# Patient Record
Sex: FEMALE | Race: BLACK OR AFRICAN AMERICAN | NOT HISPANIC OR LATINO | URBAN - METROPOLITAN AREA
[De-identification: names, ages, dates, MRNs, and addresses within clinical notes are randomized per-mention and may not be internally consistent; named-entity substitution may affect disease eponyms.]

---

## 2017-01-26 ENCOUNTER — INPATIENT (INPATIENT)
Facility: HOSPITAL | Age: 82
LOS: 12 days | Discharge: NURSING, REHAB OR LONG-TERM CARE | End: 2017-02-08
Attending: INTERNAL MEDICINE | Admitting: INTERNAL MEDICINE
Payer: MEDICARE

## 2017-01-26 VITALS
RESPIRATION RATE: 18 BRPM | OXYGEN SATURATION: 99 % | TEMPERATURE: 99 F | HEART RATE: 81 BPM | DIASTOLIC BLOOD PRESSURE: 85 MMHG | SYSTOLIC BLOOD PRESSURE: 158 MMHG

## 2017-01-26 DIAGNOSIS — Z41.8 ENCOUNTER FOR OTHER PROCEDURES FOR PURPOSES OTHER THAN REMEDYING HEALTH STATE: ICD-10-CM
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
## 2017-01-26 DIAGNOSIS — I10 ESSENTIAL (PRIMARY) HYPERTENSION: ICD-10-CM

## 2017-01-26 DIAGNOSIS — R41.82 ALTERED MENTAL STATUS, UNSPECIFIED: ICD-10-CM

## 2017-01-26 DIAGNOSIS — G30.8 OTHER ALZHEIMER'S DISEASE: ICD-10-CM

## 2017-01-26 LAB
ALBUMIN SERPL ELPH-MCNC: 4.1 G/DL — SIGNIFICANT CHANGE UP (ref 3.3–5)
ALP SERPL-CCNC: 131 U/L — HIGH (ref 40–120)
ALT FLD-CCNC: 12 U/L — SIGNIFICANT CHANGE UP (ref 4–33)
AMPHET UR-MCNC: NEGATIVE — SIGNIFICANT CHANGE UP
APAP SERPL-MCNC: < 15 UG/ML — LOW (ref 15–25)
APPEARANCE UR: CLEAR — SIGNIFICANT CHANGE UP
AST SERPL-CCNC: 26 U/L — SIGNIFICANT CHANGE UP (ref 4–32)
BARBITURATES MEASUREMENT: NEGATIVE — SIGNIFICANT CHANGE UP
BARBITURATES UR SCN-MCNC: NEGATIVE — SIGNIFICANT CHANGE UP
BASOPHILS # BLD AUTO: 0.03 K/UL — SIGNIFICANT CHANGE UP (ref 0–0.2)
BASOPHILS NFR BLD AUTO: 0.3 % — SIGNIFICANT CHANGE UP (ref 0–2)
BENZODIAZ SERPL-MCNC: NEGATIVE — SIGNIFICANT CHANGE UP
BENZODIAZ UR-MCNC: NEGATIVE — SIGNIFICANT CHANGE UP
BILIRUB SERPL-MCNC: 0.8 MG/DL — SIGNIFICANT CHANGE UP (ref 0.2–1.2)
BILIRUB UR-MCNC: NEGATIVE — SIGNIFICANT CHANGE UP
BLOOD UR QL VISUAL: NEGATIVE — SIGNIFICANT CHANGE UP
BUN SERPL-MCNC: 15 MG/DL — SIGNIFICANT CHANGE UP (ref 7–23)
CALCIUM SERPL-MCNC: 9.8 MG/DL — SIGNIFICANT CHANGE UP (ref 8.4–10.5)
CANNABINOIDS UR-MCNC: NEGATIVE — SIGNIFICANT CHANGE UP
CHLORIDE SERPL-SCNC: 104 MMOL/L — SIGNIFICANT CHANGE UP (ref 98–107)
CK SERPL-CCNC: 255 U/L — HIGH (ref 25–170)
CO2 SERPL-SCNC: 27 MMOL/L — SIGNIFICANT CHANGE UP (ref 22–31)
COCAINE METAB.OTHER UR-MCNC: NEGATIVE — SIGNIFICANT CHANGE UP
COLOR SPEC: YELLOW — SIGNIFICANT CHANGE UP
CREAT SERPL-MCNC: 0.77 MG/DL — SIGNIFICANT CHANGE UP (ref 0.5–1.3)
EOSINOPHIL # BLD AUTO: 0.43 K/UL — SIGNIFICANT CHANGE UP (ref 0–0.5)
EOSINOPHIL NFR BLD AUTO: 4.7 % — SIGNIFICANT CHANGE UP (ref 0–6)
ETHANOL BLD-MCNC: < 10 MG/DL — SIGNIFICANT CHANGE UP
GLUCOSE SERPL-MCNC: 84 MG/DL — SIGNIFICANT CHANGE UP (ref 70–99)
GLUCOSE UR-MCNC: NEGATIVE — SIGNIFICANT CHANGE UP
HCT VFR BLD CALC: 38.1 % — SIGNIFICANT CHANGE UP (ref 34.5–45)
HGB BLD-MCNC: 12.3 G/DL — SIGNIFICANT CHANGE UP (ref 11.5–15.5)
IMM GRANULOCYTES NFR BLD AUTO: 0.4 % — SIGNIFICANT CHANGE UP (ref 0–1.5)
KETONES UR-MCNC: NEGATIVE — SIGNIFICANT CHANGE UP
LEUKOCYTE ESTERASE UR-ACNC: NEGATIVE — SIGNIFICANT CHANGE UP
LYMPHOCYTES # BLD AUTO: 1.57 K/UL — SIGNIFICANT CHANGE UP (ref 1–3.3)
LYMPHOCYTES # BLD AUTO: 17.3 % — SIGNIFICANT CHANGE UP (ref 13–44)
MCHC RBC-ENTMCNC: 30.8 PG — SIGNIFICANT CHANGE UP (ref 27–34)
MCHC RBC-ENTMCNC: 32.3 % — SIGNIFICANT CHANGE UP (ref 32–36)
MCV RBC AUTO: 95.5 FL — SIGNIFICANT CHANGE UP (ref 80–100)
METHADONE UR-MCNC: NEGATIVE — SIGNIFICANT CHANGE UP
MONOCYTES # BLD AUTO: 0.58 K/UL — SIGNIFICANT CHANGE UP (ref 0–0.9)
MONOCYTES NFR BLD AUTO: 6.4 % — SIGNIFICANT CHANGE UP (ref 2–14)
MUCOUS THREADS # UR AUTO: SIGNIFICANT CHANGE UP
NEUTROPHILS # BLD AUTO: 6.45 K/UL — SIGNIFICANT CHANGE UP (ref 1.8–7.4)
NEUTROPHILS NFR BLD AUTO: 70.9 % — SIGNIFICANT CHANGE UP (ref 43–77)
NITRITE UR-MCNC: NEGATIVE — SIGNIFICANT CHANGE UP
OPIATES UR-MCNC: NEGATIVE — SIGNIFICANT CHANGE UP
OXYCODONE UR-MCNC: NEGATIVE — SIGNIFICANT CHANGE UP
PCP UR-MCNC: NEGATIVE — SIGNIFICANT CHANGE UP
PH UR: 7 — SIGNIFICANT CHANGE UP (ref 4.6–8)
PLATELET # BLD AUTO: 177 K/UL — SIGNIFICANT CHANGE UP (ref 150–400)
PMV BLD: 11.5 FL — SIGNIFICANT CHANGE UP (ref 7–13)
POTASSIUM SERPL-MCNC: 3.9 MMOL/L — SIGNIFICANT CHANGE UP (ref 3.5–5.3)
POTASSIUM SERPL-SCNC: 3.9 MMOL/L — SIGNIFICANT CHANGE UP (ref 3.5–5.3)
PROT SERPL-MCNC: 7.5 G/DL — SIGNIFICANT CHANGE UP (ref 6–8.3)
PROT UR-MCNC: NEGATIVE — SIGNIFICANT CHANGE UP
RBC # BLD: 3.99 M/UL — SIGNIFICANT CHANGE UP (ref 3.8–5.2)
RBC # FLD: 14.3 % — SIGNIFICANT CHANGE UP (ref 10.3–14.5)
RBC CASTS # UR COMP ASSIST: SIGNIFICANT CHANGE UP (ref 0–?)
SALICYLATES SERPL-MCNC: < 5 MG/DL — LOW (ref 15–30)
SODIUM SERPL-SCNC: 141 MMOL/L — SIGNIFICANT CHANGE UP (ref 135–145)
SP GR SPEC: 1.02 — SIGNIFICANT CHANGE UP (ref 1–1.03)
SQUAMOUS # UR AUTO: SIGNIFICANT CHANGE UP
TROPONIN T SERPL-MCNC: < 0.06 NG/ML — SIGNIFICANT CHANGE UP (ref 0–0.06)
TSH SERPL-MCNC: 1.46 UIU/ML — SIGNIFICANT CHANGE UP (ref 0.27–4.2)
UROBILINOGEN FLD QL: NORMAL E.U. — SIGNIFICANT CHANGE UP (ref 0.1–0.2)
WBC # BLD: 9.1 K/UL — SIGNIFICANT CHANGE UP (ref 3.8–10.5)
WBC # FLD AUTO: 9.1 K/UL — SIGNIFICANT CHANGE UP (ref 3.8–10.5)
WBC UR QL: SIGNIFICANT CHANGE UP (ref 0–?)

## 2017-01-26 PROCEDURE — 99223 1ST HOSP IP/OBS HIGH 75: CPT

## 2017-01-26 PROCEDURE — 70450 CT HEAD/BRAIN W/O DYE: CPT | Mod: 26

## 2017-01-26 PROCEDURE — 72192 CT PELVIS W/O DYE: CPT | Mod: 26

## 2017-01-26 PROCEDURE — 71010: CPT | Mod: 26

## 2017-01-26 RX ORDER — ENOXAPARIN SODIUM 100 MG/ML
40 INJECTION SUBCUTANEOUS EVERY 24 HOURS
Qty: 0 | Refills: 0 | Status: DISCONTINUED | OUTPATIENT
Start: 2017-01-26 | End: 2017-02-08

## 2017-01-26 RX ORDER — SODIUM CHLORIDE 9 MG/ML
500 INJECTION INTRAMUSCULAR; INTRAVENOUS; SUBCUTANEOUS ONCE
Qty: 0 | Refills: 0 | Status: COMPLETED | OUTPATIENT
Start: 2017-01-26 | End: 2017-01-26

## 2017-01-26 RX ADMIN — SODIUM CHLORIDE 500 MILLILITER(S): 9 INJECTION INTRAMUSCULAR; INTRAVENOUS; SUBCUTANEOUS at 17:23

## 2017-01-26 NOTE — H&P ADULT. - HISTORY OF PRESENT ILLNESS
89 yo M with h/o CHF, colon CA, HTN, A fib brought by family after found down. Unable to obtain history from pt due to dementia. Per son at bedside, pt was found down in her room, initially unresponsive for a few seconds. After pt was awake she was not able to stand and refused assistance, appeared confused more than her baseline. When son left room for help, pt apparently crawled to the door and locked herself in her room. When family unlocked the door they found that she urinated on herself. At this time, pt has no complaints, she knows she is in the hospital but has no memory of events prior. Per family, at baseline she is able to walk with a cane, she is usually confused and at times talks about events long past and sometimes does not recognize family members but can be reoriented and redirected. Her son says the day prior she was at baseline, had no recent illness, no complaints. However she has had poor appetite and does not eat.     ED VS: 158/85  81  98.8  18  99% on RA  Pt was given 500cc NS

## 2017-01-26 NOTE — ED PROVIDER NOTE - ATTENDING CONTRIBUTION TO CARE
Adam SALGADO- pt with h/o dementia was found by aide on floor and soaked in her urine. Per pt, she was just trying to go to bathroom. Pt uses walker at baseline and had deformed left leg. Pt denies nay complaints and states that she was supposed to be at her grandsons wedding.     Pt is alert but disoriented elderly female, in no acute distress, sitting comfortably in the chair, s1s2 normal reg, b/l clear breathe sounds, abd soft, nt, nd, pelvis stable, nt, left leg has a curve appears malunion - chronic, skin warm, dry, good turgor, no focal deficits, peerl eomi, no pronator drift, pt has equal strength and sensation in all the extremities

## 2017-01-26 NOTE — ED PROVIDER NOTE - MEDICAL DECISION MAKING DETAILS
ekg, labs, ct head, ua, likely admit 80 y/o F hx Alzheimer's, HTN  CT  head, CT pelvis , chest x-ray,  Urine, Labs, EKG. Geriatric social  worker called. Admit to medicine

## 2017-01-26 NOTE — ED ADULT TRIAGE NOTE - CHIEF COMPLAINT QUOTE
Patient brought to ER from home by EMS. Pt was found on floor, urinated on herself and has increased Alzheimers.

## 2017-01-26 NOTE — H&P ADULT. - PROBLEM SELECTOR PLAN 1
- Monitor on tele given possibility of syncope  - Unable to check orthostatics  - PT consult   - SW consult- family requesting more hours of aid at home  - fall precautions

## 2017-01-26 NOTE — H&P ADULT. - PROBLEM SELECTOR PLAN 3
- Fall precautions  - Pt occasionally agitated- enhanced supervision. haldol prn if needed. QTc is wnl  - Family requesting geriatric psych eval- can be done as out pt

## 2017-01-26 NOTE — ED PROVIDER NOTE - CARE PLAN
Principal Discharge DX:	Fall  Secondary Diagnosis:	Dementia Principal Discharge DX:	Fall  Secondary Diagnosis:	Dementia  Secondary Diagnosis:	Altered mental status

## 2017-01-26 NOTE — ED PROVIDER NOTE - OBJECTIVE STATEMENT
82 y/o F hx Alzheimer's, HTN BIB family w c/o unwitnessed fall, found on floor in bedroom soaked in urine. Son, states that patient has  base forgetfulfullness, but is not incontinent or suffered any recent falls . Patient unable to participate in interview. States' I want to go to my grandson wedding". Cooperative, no acute distress, or visible deformities or injuries. 82 y/o F hx Alzheimer's, HTN BIB family w c/o unwitnessed fall, found on floor in bedroom soaked in urine. Son, states that patient has  base forgetfulness, but is not incontinent or suffered any recent falls .Hx of deformed left leg.   Patient unable to participate in interview. States' I want to go to my grandson wedding". Cooperative, no acute distress, or visible  new deformities or injuries. Alert, confused

## 2017-01-26 NOTE — H&P ADULT. - PROBLEM SELECTOR PLAN 2
- CT neg, labs unremarkable   - Unclear etiology  - May be progression of her alzheimers  - Will monitor overnight  -

## 2017-01-27 LAB
BASOPHILS # BLD AUTO: 0.02 K/UL — SIGNIFICANT CHANGE UP (ref 0–0.2)
BASOPHILS NFR BLD AUTO: 0.2 % — SIGNIFICANT CHANGE UP (ref 0–2)
BUN SERPL-MCNC: 10 MG/DL — SIGNIFICANT CHANGE UP (ref 7–23)
CALCIUM SERPL-MCNC: 9.6 MG/DL — SIGNIFICANT CHANGE UP (ref 8.4–10.5)
CHLORIDE SERPL-SCNC: 104 MMOL/L — SIGNIFICANT CHANGE UP (ref 98–107)
CK MB BLD-MCNC: 3.76 NG/ML — SIGNIFICANT CHANGE UP (ref 1–4.7)
CK SERPL-CCNC: 228 U/L — HIGH (ref 25–170)
CO2 SERPL-SCNC: 26 MMOL/L — SIGNIFICANT CHANGE UP (ref 22–31)
CREAT SERPL-MCNC: 0.74 MG/DL — SIGNIFICANT CHANGE UP (ref 0.5–1.3)
EOSINOPHIL # BLD AUTO: 0.32 K/UL — SIGNIFICANT CHANGE UP (ref 0–0.5)
EOSINOPHIL NFR BLD AUTO: 3.6 % — SIGNIFICANT CHANGE UP (ref 0–6)
GLUCOSE SERPL-MCNC: 85 MG/DL — SIGNIFICANT CHANGE UP (ref 70–99)
HCT VFR BLD CALC: 37.8 % — SIGNIFICANT CHANGE UP (ref 34.5–45)
HGB BLD-MCNC: 12.5 G/DL — SIGNIFICANT CHANGE UP (ref 11.5–15.5)
IMM GRANULOCYTES NFR BLD AUTO: 0.2 % — SIGNIFICANT CHANGE UP (ref 0–1.5)
LYMPHOCYTES # BLD AUTO: 1.18 K/UL — SIGNIFICANT CHANGE UP (ref 1–3.3)
LYMPHOCYTES # BLD AUTO: 13.2 % — SIGNIFICANT CHANGE UP (ref 13–44)
MCHC RBC-ENTMCNC: 31.3 PG — SIGNIFICANT CHANGE UP (ref 27–34)
MCHC RBC-ENTMCNC: 33.1 % — SIGNIFICANT CHANGE UP (ref 32–36)
MCV RBC AUTO: 94.7 FL — SIGNIFICANT CHANGE UP (ref 80–100)
MONOCYTES # BLD AUTO: 0.54 K/UL — SIGNIFICANT CHANGE UP (ref 0–0.9)
MONOCYTES NFR BLD AUTO: 6 % — SIGNIFICANT CHANGE UP (ref 2–14)
NEUTROPHILS # BLD AUTO: 6.85 K/UL — SIGNIFICANT CHANGE UP (ref 1.8–7.4)
NEUTROPHILS NFR BLD AUTO: 76.8 % — SIGNIFICANT CHANGE UP (ref 43–77)
PLATELET # BLD AUTO: 158 K/UL — SIGNIFICANT CHANGE UP (ref 150–400)
PMV BLD: 11.5 FL — SIGNIFICANT CHANGE UP (ref 7–13)
POTASSIUM SERPL-MCNC: 4.1 MMOL/L — SIGNIFICANT CHANGE UP (ref 3.5–5.3)
POTASSIUM SERPL-SCNC: 4.1 MMOL/L — SIGNIFICANT CHANGE UP (ref 3.5–5.3)
RBC # BLD: 3.99 M/UL — SIGNIFICANT CHANGE UP (ref 3.8–5.2)
RBC # FLD: 14.4 % — SIGNIFICANT CHANGE UP (ref 10.3–14.5)
SODIUM SERPL-SCNC: 141 MMOL/L — SIGNIFICANT CHANGE UP (ref 135–145)
TROPONIN T SERPL-MCNC: < 0.06 NG/ML — SIGNIFICANT CHANGE UP (ref 0–0.06)
WBC # BLD: 8.93 K/UL — SIGNIFICANT CHANGE UP (ref 3.8–10.5)
WBC # FLD AUTO: 8.93 K/UL — SIGNIFICANT CHANGE UP (ref 3.8–10.5)

## 2017-01-27 RX ORDER — DOCUSATE SODIUM 100 MG
100 CAPSULE ORAL
Qty: 0 | Refills: 0 | Status: DISCONTINUED | OUTPATIENT
Start: 2017-01-27 | End: 2017-02-08

## 2017-01-27 RX ORDER — POLYETHYLENE GLYCOL 3350 17 G/17G
17 POWDER, FOR SOLUTION ORAL AT BEDTIME
Qty: 0 | Refills: 0 | Status: DISCONTINUED | OUTPATIENT
Start: 2017-01-27 | End: 2017-02-08

## 2017-01-27 RX ORDER — HALOPERIDOL DECANOATE 100 MG/ML
0.5 INJECTION INTRAMUSCULAR
Qty: 0 | Refills: 0 | Status: DISCONTINUED | OUTPATIENT
Start: 2017-01-27 | End: 2017-02-08

## 2017-01-27 RX ORDER — AMLODIPINE BESYLATE 2.5 MG/1
5 TABLET ORAL DAILY
Qty: 0 | Refills: 0 | Status: DISCONTINUED | OUTPATIENT
Start: 2017-01-27 | End: 2017-02-08

## 2017-01-27 RX ADMIN — HALOPERIDOL DECANOATE 0.5 MILLIGRAM(S): 100 INJECTION INTRAMUSCULAR at 22:43

## 2017-01-27 RX ADMIN — HALOPERIDOL DECANOATE 0.5 MILLIGRAM(S): 100 INJECTION INTRAMUSCULAR at 17:02

## 2017-01-27 RX ADMIN — AMLODIPINE BESYLATE 5 MILLIGRAM(S): 2.5 TABLET ORAL at 06:04

## 2017-01-27 RX ADMIN — ENOXAPARIN SODIUM 40 MILLIGRAM(S): 100 INJECTION SUBCUTANEOUS at 13:29

## 2017-01-28 LAB
BACTERIA UR CULT: SIGNIFICANT CHANGE UP
BASOPHILS # BLD AUTO: 0.03 K/UL — SIGNIFICANT CHANGE UP (ref 0–0.2)
BASOPHILS NFR BLD AUTO: 0.3 % — SIGNIFICANT CHANGE UP (ref 0–2)
BUN SERPL-MCNC: 15 MG/DL — SIGNIFICANT CHANGE UP (ref 7–23)
CALCIUM SERPL-MCNC: 9.7 MG/DL — SIGNIFICANT CHANGE UP (ref 8.4–10.5)
CHLORIDE SERPL-SCNC: 105 MMOL/L — SIGNIFICANT CHANGE UP (ref 98–107)
CO2 SERPL-SCNC: 21 MMOL/L — LOW (ref 22–31)
CREAT SERPL-MCNC: 0.88 MG/DL — SIGNIFICANT CHANGE UP (ref 0.5–1.3)
EOSINOPHIL # BLD AUTO: 0.33 K/UL — SIGNIFICANT CHANGE UP (ref 0–0.5)
EOSINOPHIL NFR BLD AUTO: 3.6 % — SIGNIFICANT CHANGE UP (ref 0–6)
GLUCOSE SERPL-MCNC: 69 MG/DL — LOW (ref 70–99)
HCT VFR BLD CALC: 42.2 % — SIGNIFICANT CHANGE UP (ref 34.5–45)
HGB BLD-MCNC: 13.7 G/DL — SIGNIFICANT CHANGE UP (ref 11.5–15.5)
IMM GRANULOCYTES NFR BLD AUTO: 0.2 % — SIGNIFICANT CHANGE UP (ref 0–1.5)
LYMPHOCYTES # BLD AUTO: 1.88 K/UL — SIGNIFICANT CHANGE UP (ref 1–3.3)
LYMPHOCYTES # BLD AUTO: 20.6 % — SIGNIFICANT CHANGE UP (ref 13–44)
MCHC RBC-ENTMCNC: 31.1 PG — SIGNIFICANT CHANGE UP (ref 27–34)
MCHC RBC-ENTMCNC: 32.5 % — SIGNIFICANT CHANGE UP (ref 32–36)
MCV RBC AUTO: 95.9 FL — SIGNIFICANT CHANGE UP (ref 80–100)
MONOCYTES # BLD AUTO: 0.65 K/UL — SIGNIFICANT CHANGE UP (ref 0–0.9)
MONOCYTES NFR BLD AUTO: 7.1 % — SIGNIFICANT CHANGE UP (ref 2–14)
NEUTROPHILS # BLD AUTO: 6.23 K/UL — SIGNIFICANT CHANGE UP (ref 1.8–7.4)
NEUTROPHILS NFR BLD AUTO: 68.2 % — SIGNIFICANT CHANGE UP (ref 43–77)
PLATELET # BLD AUTO: 128 K/UL — LOW (ref 150–400)
PMV BLD: 12.9 FL — SIGNIFICANT CHANGE UP (ref 7–13)
POTASSIUM SERPL-MCNC: 4.3 MMOL/L — SIGNIFICANT CHANGE UP (ref 3.5–5.3)
POTASSIUM SERPL-SCNC: 4.3 MMOL/L — SIGNIFICANT CHANGE UP (ref 3.5–5.3)
RBC # BLD: 4.4 M/UL — SIGNIFICANT CHANGE UP (ref 3.8–5.2)
RBC # FLD: 14.9 % — HIGH (ref 10.3–14.5)
SODIUM SERPL-SCNC: 143 MMOL/L — SIGNIFICANT CHANGE UP (ref 135–145)
SPECIMEN SOURCE: SIGNIFICANT CHANGE UP
WBC # BLD: 9.14 K/UL — SIGNIFICANT CHANGE UP (ref 3.8–10.5)
WBC # FLD AUTO: 9.14 K/UL — SIGNIFICANT CHANGE UP (ref 3.8–10.5)

## 2017-01-28 RX ADMIN — POLYETHYLENE GLYCOL 3350 17 GRAM(S): 17 POWDER, FOR SOLUTION ORAL at 22:12

## 2017-01-28 RX ADMIN — Medication 100 MILLIGRAM(S): at 06:44

## 2017-01-28 RX ADMIN — Medication 100 MILLIGRAM(S): at 17:42

## 2017-01-28 RX ADMIN — ENOXAPARIN SODIUM 40 MILLIGRAM(S): 100 INJECTION SUBCUTANEOUS at 12:10

## 2017-01-28 RX ADMIN — AMLODIPINE BESYLATE 5 MILLIGRAM(S): 2.5 TABLET ORAL at 06:35

## 2017-01-29 LAB
APPEARANCE UR: SIGNIFICANT CHANGE UP
BILIRUB UR-MCNC: NEGATIVE — SIGNIFICANT CHANGE UP
BLOOD UR QL VISUAL: HIGH
BUN SERPL-MCNC: 19 MG/DL — SIGNIFICANT CHANGE UP (ref 7–23)
CALCIUM SERPL-MCNC: 9.5 MG/DL — SIGNIFICANT CHANGE UP (ref 8.4–10.5)
CHLORIDE SERPL-SCNC: 104 MMOL/L — SIGNIFICANT CHANGE UP (ref 98–107)
CO2 SERPL-SCNC: 18 MMOL/L — LOW (ref 22–31)
COLOR SPEC: YELLOW — SIGNIFICANT CHANGE UP
CREAT SERPL-MCNC: 0.8 MG/DL — SIGNIFICANT CHANGE UP (ref 0.5–1.3)
GLUCOSE SERPL-MCNC: 87 MG/DL — SIGNIFICANT CHANGE UP (ref 70–99)
GLUCOSE UR-MCNC: NEGATIVE — SIGNIFICANT CHANGE UP
HCT VFR BLD CALC: 40.8 % — SIGNIFICANT CHANGE UP (ref 34.5–45)
HGB BLD-MCNC: 13.2 G/DL — SIGNIFICANT CHANGE UP (ref 11.5–15.5)
HYALINE CASTS # UR AUTO: SIGNIFICANT CHANGE UP (ref 0–?)
KETONES UR-MCNC: NEGATIVE — SIGNIFICANT CHANGE UP
LEUKOCYTE ESTERASE UR-ACNC: HIGH
MAGNESIUM SERPL-MCNC: 2 MG/DL — SIGNIFICANT CHANGE UP (ref 1.6–2.6)
MCHC RBC-ENTMCNC: 30.8 PG — SIGNIFICANT CHANGE UP (ref 27–34)
MCHC RBC-ENTMCNC: 32.4 % — SIGNIFICANT CHANGE UP (ref 32–36)
MCV RBC AUTO: 95.1 FL — SIGNIFICANT CHANGE UP (ref 80–100)
MUCOUS THREADS # UR AUTO: SIGNIFICANT CHANGE UP
NITRITE UR-MCNC: NEGATIVE — SIGNIFICANT CHANGE UP
PH UR: 6 — SIGNIFICANT CHANGE UP (ref 4.6–8)
PLATELET # BLD AUTO: 164 K/UL — SIGNIFICANT CHANGE UP (ref 150–400)
PMV BLD: 11.7 FL — SIGNIFICANT CHANGE UP (ref 7–13)
POTASSIUM SERPL-MCNC: 4.5 MMOL/L — SIGNIFICANT CHANGE UP (ref 3.5–5.3)
POTASSIUM SERPL-SCNC: 4.5 MMOL/L — SIGNIFICANT CHANGE UP (ref 3.5–5.3)
PROT UR-MCNC: 100 — HIGH
RBC # BLD: 4.29 M/UL — SIGNIFICANT CHANGE UP (ref 3.8–5.2)
RBC # FLD: 14.5 % — SIGNIFICANT CHANGE UP (ref 10.3–14.5)
RBC CASTS # UR COMP ASSIST: >50 — HIGH (ref 0–?)
SODIUM SERPL-SCNC: 141 MMOL/L — SIGNIFICANT CHANGE UP (ref 135–145)
SP GR SPEC: 1.03 — SIGNIFICANT CHANGE UP (ref 1–1.03)
UROBILINOGEN FLD QL: 1 E.U. — SIGNIFICANT CHANGE UP (ref 0.1–0.2)
WBC # BLD: 10.07 K/UL — SIGNIFICANT CHANGE UP (ref 3.8–10.5)
WBC # FLD AUTO: 10.07 K/UL — SIGNIFICANT CHANGE UP (ref 3.8–10.5)
WBC UR QL: >50 — HIGH (ref 0–?)

## 2017-01-29 RX ORDER — CEFTRIAXONE 500 MG/1
1 INJECTION, POWDER, FOR SOLUTION INTRAMUSCULAR; INTRAVENOUS EVERY 24 HOURS
Qty: 0 | Refills: 0 | Status: DISCONTINUED | OUTPATIENT
Start: 2017-01-30 | End: 2017-02-01

## 2017-01-29 RX ORDER — CEFTRIAXONE 500 MG/1
INJECTION, POWDER, FOR SOLUTION INTRAMUSCULAR; INTRAVENOUS
Qty: 0 | Refills: 0 | Status: DISCONTINUED | OUTPATIENT
Start: 2017-01-29 | End: 2017-02-01

## 2017-01-29 RX ORDER — CEFTRIAXONE 500 MG/1
1 INJECTION, POWDER, FOR SOLUTION INTRAMUSCULAR; INTRAVENOUS ONCE
Qty: 0 | Refills: 0 | Status: COMPLETED | OUTPATIENT
Start: 2017-01-29 | End: 2017-01-29

## 2017-01-29 RX ADMIN — CEFTRIAXONE 100 GRAM(S): 500 INJECTION, POWDER, FOR SOLUTION INTRAMUSCULAR; INTRAVENOUS at 13:56

## 2017-01-29 RX ADMIN — HALOPERIDOL DECANOATE 0.5 MILLIGRAM(S): 100 INJECTION INTRAMUSCULAR at 11:58

## 2017-01-29 RX ADMIN — AMLODIPINE BESYLATE 5 MILLIGRAM(S): 2.5 TABLET ORAL at 05:21

## 2017-01-29 RX ADMIN — POLYETHYLENE GLYCOL 3350 17 GRAM(S): 17 POWDER, FOR SOLUTION ORAL at 21:11

## 2017-01-29 RX ADMIN — HALOPERIDOL DECANOATE 0.5 MILLIGRAM(S): 100 INJECTION INTRAMUSCULAR at 20:16

## 2017-01-29 RX ADMIN — Medication 100 MILLIGRAM(S): at 17:18

## 2017-01-29 RX ADMIN — Medication 100 MILLIGRAM(S): at 05:21

## 2017-01-29 RX ADMIN — ENOXAPARIN SODIUM 40 MILLIGRAM(S): 100 INJECTION SUBCUTANEOUS at 11:59

## 2017-01-30 LAB
BUN SERPL-MCNC: 19 MG/DL — SIGNIFICANT CHANGE UP (ref 7–23)
CALCIUM SERPL-MCNC: 9.3 MG/DL — SIGNIFICANT CHANGE UP (ref 8.4–10.5)
CHLORIDE SERPL-SCNC: 104 MMOL/L — SIGNIFICANT CHANGE UP (ref 98–107)
CO2 SERPL-SCNC: 23 MMOL/L — SIGNIFICANT CHANGE UP (ref 22–31)
CREAT SERPL-MCNC: 0.88 MG/DL — SIGNIFICANT CHANGE UP (ref 0.5–1.3)
GLUCOSE SERPL-MCNC: 67 MG/DL — LOW (ref 70–99)
POTASSIUM SERPL-MCNC: 4.7 MMOL/L — SIGNIFICANT CHANGE UP (ref 3.5–5.3)
POTASSIUM SERPL-SCNC: 4.7 MMOL/L — SIGNIFICANT CHANGE UP (ref 3.5–5.3)
SODIUM SERPL-SCNC: 142 MMOL/L — SIGNIFICANT CHANGE UP (ref 135–145)

## 2017-01-30 RX ADMIN — Medication 100 MILLIGRAM(S): at 06:21

## 2017-01-30 RX ADMIN — CEFTRIAXONE 100 GRAM(S): 500 INJECTION, POWDER, FOR SOLUTION INTRAMUSCULAR; INTRAVENOUS at 13:55

## 2017-01-30 RX ADMIN — AMLODIPINE BESYLATE 5 MILLIGRAM(S): 2.5 TABLET ORAL at 06:21

## 2017-01-30 RX ADMIN — POLYETHYLENE GLYCOL 3350 17 GRAM(S): 17 POWDER, FOR SOLUTION ORAL at 22:57

## 2017-01-30 RX ADMIN — Medication 100 MILLIGRAM(S): at 17:47

## 2017-01-30 RX ADMIN — ENOXAPARIN SODIUM 40 MILLIGRAM(S): 100 INJECTION SUBCUTANEOUS at 13:56

## 2017-01-31 LAB
BUN SERPL-MCNC: 18 MG/DL — SIGNIFICANT CHANGE UP (ref 7–23)
CALCIUM SERPL-MCNC: 9.3 MG/DL — SIGNIFICANT CHANGE UP (ref 8.4–10.5)
CHLORIDE SERPL-SCNC: 108 MMOL/L — HIGH (ref 98–107)
CO2 SERPL-SCNC: 23 MMOL/L — SIGNIFICANT CHANGE UP (ref 22–31)
CREAT SERPL-MCNC: 0.84 MG/DL — SIGNIFICANT CHANGE UP (ref 0.5–1.3)
GLUCOSE SERPL-MCNC: 81 MG/DL — SIGNIFICANT CHANGE UP (ref 70–99)
MAGNESIUM SERPL-MCNC: 1.9 MG/DL — SIGNIFICANT CHANGE UP (ref 1.6–2.6)
POTASSIUM SERPL-MCNC: 4.2 MMOL/L — SIGNIFICANT CHANGE UP (ref 3.5–5.3)
POTASSIUM SERPL-SCNC: 4.2 MMOL/L — SIGNIFICANT CHANGE UP (ref 3.5–5.3)
SODIUM SERPL-SCNC: 145 MMOL/L — SIGNIFICANT CHANGE UP (ref 135–145)

## 2017-01-31 RX ADMIN — AMLODIPINE BESYLATE 5 MILLIGRAM(S): 2.5 TABLET ORAL at 06:10

## 2017-01-31 RX ADMIN — CEFTRIAXONE 100 GRAM(S): 500 INJECTION, POWDER, FOR SOLUTION INTRAMUSCULAR; INTRAVENOUS at 11:46

## 2017-01-31 RX ADMIN — Medication 100 MILLIGRAM(S): at 06:10

## 2017-01-31 RX ADMIN — ENOXAPARIN SODIUM 40 MILLIGRAM(S): 100 INJECTION SUBCUTANEOUS at 11:46

## 2017-01-31 RX ADMIN — Medication 100 MILLIGRAM(S): at 17:23

## 2017-01-31 RX ADMIN — POLYETHYLENE GLYCOL 3350 17 GRAM(S): 17 POWDER, FOR SOLUTION ORAL at 21:41

## 2017-02-01 RX ORDER — POLYETHYLENE GLYCOL 3350 17 G/17G
17 POWDER, FOR SOLUTION ORAL
Qty: 0 | Refills: 0 | COMMUNITY
Start: 2017-02-01

## 2017-02-01 RX ORDER — AMLODIPINE BESYLATE 2.5 MG/1
1 TABLET ORAL
Qty: 0 | Refills: 0 | COMMUNITY
Start: 2017-02-01

## 2017-02-01 RX ORDER — DOCUSATE SODIUM 100 MG
1 CAPSULE ORAL
Qty: 0 | Refills: 0 | COMMUNITY
Start: 2017-02-01

## 2017-02-01 RX ADMIN — ENOXAPARIN SODIUM 40 MILLIGRAM(S): 100 INJECTION SUBCUTANEOUS at 12:43

## 2017-02-01 RX ADMIN — Medication 100 MILLIGRAM(S): at 05:15

## 2017-02-01 RX ADMIN — AMLODIPINE BESYLATE 5 MILLIGRAM(S): 2.5 TABLET ORAL at 05:16

## 2017-02-01 RX ADMIN — Medication 100 MILLIGRAM(S): at 18:22

## 2017-02-01 RX ADMIN — CEFTRIAXONE 100 GRAM(S): 500 INJECTION, POWDER, FOR SOLUTION INTRAMUSCULAR; INTRAVENOUS at 12:43

## 2017-02-01 RX ADMIN — POLYETHYLENE GLYCOL 3350 17 GRAM(S): 17 POWDER, FOR SOLUTION ORAL at 22:14

## 2017-02-01 NOTE — DISCHARGE NOTE ADULT - MEDICATION SUMMARY - MEDICATIONS TO TAKE
I will START or STAY ON the medications listed below when I get home from the hospital:    amLODIPine 5 mg oral tablet  -- 1 tab(s) by mouth once a day  -- Indication: For Essential hypertension    docusate sodium 100 mg oral capsule  -- 1 cap(s) by mouth 2 times a day, As Needed  -- Indication: For Need for prophylactic measure    polyethylene glycol 3350 oral powder for reconstitution  -- 17 gram(s) by mouth once a day (at bedtime), As Needed  -- Indication: For Need for prophylactic measure

## 2017-02-01 NOTE — DISCHARGE NOTE ADULT - ADDITIONAL INSTRUCTIONS
Please follow up with PCP, Urology and Geripsych once discharged from rehab. Please call to make all appointments.

## 2017-02-01 NOTE — DISCHARGE NOTE ADULT - PLAN OF CARE
safety All images done while hospitalized were negative for acute injuries. Fall precautions. Promote safe environment. Participate in rehab programs. Continue medications. Follow up with your PCP for further evaluation and management. Please call to make an appointment. target bp 140/90 You received 3 days of IV antibiotics. You never had any discomfort when urinating, however you had blood in your urine. Seek  medical attention of bleeding persists and or your start having discomfort; ie burining. Recommended to follow up with Urology as outpatient. Please call to make an appointment.

## 2017-02-01 NOTE — DISCHARGE NOTE ADULT - PATIENT PORTAL LINK FT
“You can access the FollowHealth Patient Portal, offered by [facility], by registering with the following website: [email or web address]”

## 2017-02-01 NOTE — DISCHARGE NOTE ADULT - COMMUNITY RESOURCES
[facility] [address] [place] [address] Phone: [phone]. . [facility] [phone]

## 2017-02-01 NOTE — DISCHARGE NOTE ADULT - PROVIDER TOKENS
FREE:[LAST:[Urology],PHONE:[phone]],FAX:[(   )    -]],FREE:[LAST:[[facility]],PHONE:[phone]],FAX:[(   )    -]],FREE:[LAST:[PCP],PHONE:[(   )    -],FAX:[(   )    -],ADDRESS:[Your PCP]]

## 2017-02-01 NOTE — DISCHARGE NOTE ADULT - CARE PROVIDER_API CALL
Urology,   Phone: [phone]  Fax: (   )    -    [facility],   Phone: [phone]  Fax: (   )    -    PCP,   Your PCP  Phone: (   )    -  Fax: (   )    -

## 2017-02-01 NOTE — DISCHARGE NOTE ADULT - HOSPITAL COURSE
82 y/o F with h/o  HTN, Dementia brought by family after found down from an unwitnessed fall.     Fall, - CT pelvis neg Fx - CXR -Small right pleural effusion.- PT consult Rehab    - Altered mental status - CT neg, labs unremarkable     Alzheimer's dementia with behavioral disturbance  - Pt occasionally agitated  - enhanced supervision. haldol prn if needed. QTc is wnl  - Family requesting geriatric psych eval- can be done as out pt.     Essential hypertension -Monitor BP overnight - CE neg x2-  CT Pelvis-Advanced lower lumbar spine degenerative disease.    1/29 Patient experienced hematuria. repeat UA +, empirically treated with IV Rocephin for 3 days. Repeat Urine culture never collected, ordered from 1/29. Patient asymptomatic of dysuria, WBC nl, afebrile just experience hematuria. Patient recommended to follow up with Urology as outpatient and PCP if develops dysuria.     Patient medically stable for discharge pending acceptance to rehab. 80 y/o F with h/o  HTN, Dementia brought by family after found down from an unwitnessed fall.     Fall, - CT pelvis neg Fx - CXR -Small right pleural effusion.- PT consult Rehab    - Altered mental status - CT neg, labs unremarkable     Alzheimer's dementia with behavioral disturbance  - Pt occasionally agitated  - enhanced supervision. haldol prn if needed. QTc is wnl  - Family requesting geriatric psych eval- can be done as out pt.     Essential hypertension -Monitor BP overnight - CE neg x2-  CT Pelvis-Advanced lower lumbar spine degenerative disease.    1/29 Patient experienced hematuria. repeat UA +, empirically treated with IV Rocephin for 3 days. Repeat Urine culture never collected, ordered from 1/29. Patient asymptomatic of dysuria, WBC nl, afebrile just experience hematuria. Patient recommended to follow up with Urology as outpatient and PCP if develops dysuria.     Patient medically stable for discharge  to rehab.

## 2017-02-02 RX ADMIN — AMLODIPINE BESYLATE 5 MILLIGRAM(S): 2.5 TABLET ORAL at 06:20

## 2017-02-02 RX ADMIN — Medication 100 MILLIGRAM(S): at 06:20

## 2017-02-02 RX ADMIN — ENOXAPARIN SODIUM 40 MILLIGRAM(S): 100 INJECTION SUBCUTANEOUS at 14:35

## 2017-02-02 RX ADMIN — Medication 100 MILLIGRAM(S): at 18:08

## 2017-02-02 NOTE — DIETITIAN INITIAL EVALUATION ADULT. - NS AS NUTRI INTERV MEALS SNACK
Fat - modified diet/Mineral - modified diet/1. Continue current diet order, which remains appropriate at this time. 2. Monitor weights, labs, BM's, skin integrity, p.o. intake. 3. Please assist with meals, and provide alternative as needed./Other (specify)

## 2017-02-02 NOTE — DIETITIAN INITIAL EVALUATION ADULT. - OTHER INFO
Pt seen for LOS day 7. 80 y/o F with h/o HTN, Dementia admit 2/2 Fall. Currently on DASH diet, pt endorses good appetite and PO intake ( >75%) as per RN and staff. No nausea/vomiting/diarrhea/constipation or difficulty chewing and swallowing reported to RDN. NKFA. Unable to obtain nutrition-related hx. from staff. Nutrition education is not feasible at this time. Staff made aware RDN remains available.

## 2017-02-03 RX ADMIN — POLYETHYLENE GLYCOL 3350 17 GRAM(S): 17 POWDER, FOR SOLUTION ORAL at 22:04

## 2017-02-03 RX ADMIN — AMLODIPINE BESYLATE 5 MILLIGRAM(S): 2.5 TABLET ORAL at 06:32

## 2017-02-03 RX ADMIN — Medication 100 MILLIGRAM(S): at 17:11

## 2017-02-03 RX ADMIN — ENOXAPARIN SODIUM 40 MILLIGRAM(S): 100 INJECTION SUBCUTANEOUS at 15:39

## 2017-02-03 RX ADMIN — Medication 100 MILLIGRAM(S): at 06:32

## 2017-02-04 RX ADMIN — Medication 100 MILLIGRAM(S): at 06:32

## 2017-02-04 RX ADMIN — Medication 100 MILLIGRAM(S): at 18:10

## 2017-02-04 RX ADMIN — ENOXAPARIN SODIUM 40 MILLIGRAM(S): 100 INJECTION SUBCUTANEOUS at 13:21

## 2017-02-04 RX ADMIN — AMLODIPINE BESYLATE 5 MILLIGRAM(S): 2.5 TABLET ORAL at 06:32

## 2017-02-04 RX ADMIN — POLYETHYLENE GLYCOL 3350 17 GRAM(S): 17 POWDER, FOR SOLUTION ORAL at 22:24

## 2017-02-05 RX ADMIN — Medication 100 MILLIGRAM(S): at 05:43

## 2017-02-05 RX ADMIN — ENOXAPARIN SODIUM 40 MILLIGRAM(S): 100 INJECTION SUBCUTANEOUS at 16:59

## 2017-02-05 RX ADMIN — Medication 100 MILLIGRAM(S): at 17:02

## 2017-02-05 RX ADMIN — AMLODIPINE BESYLATE 5 MILLIGRAM(S): 2.5 TABLET ORAL at 05:43

## 2017-02-05 RX ADMIN — POLYETHYLENE GLYCOL 3350 17 GRAM(S): 17 POWDER, FOR SOLUTION ORAL at 21:20

## 2017-02-06 LAB
BACTERIA UR CULT: SIGNIFICANT CHANGE UP
SPECIMEN SOURCE: SIGNIFICANT CHANGE UP

## 2017-02-06 RX ADMIN — HALOPERIDOL DECANOATE 0.5 MILLIGRAM(S): 100 INJECTION INTRAMUSCULAR at 15:13

## 2017-02-06 RX ADMIN — Medication 100 MILLIGRAM(S): at 06:16

## 2017-02-06 RX ADMIN — Medication 100 MILLIGRAM(S): at 18:22

## 2017-02-06 RX ADMIN — AMLODIPINE BESYLATE 5 MILLIGRAM(S): 2.5 TABLET ORAL at 06:16

## 2017-02-06 RX ADMIN — ENOXAPARIN SODIUM 40 MILLIGRAM(S): 100 INJECTION SUBCUTANEOUS at 13:10

## 2017-02-07 RX ADMIN — Medication 100 MILLIGRAM(S): at 05:20

## 2017-02-07 RX ADMIN — ENOXAPARIN SODIUM 40 MILLIGRAM(S): 100 INJECTION SUBCUTANEOUS at 12:31

## 2017-02-07 RX ADMIN — POLYETHYLENE GLYCOL 3350 17 GRAM(S): 17 POWDER, FOR SOLUTION ORAL at 22:09

## 2017-02-07 RX ADMIN — AMLODIPINE BESYLATE 5 MILLIGRAM(S): 2.5 TABLET ORAL at 05:20

## 2017-02-07 RX ADMIN — Medication 100 MILLIGRAM(S): at 17:34

## 2017-02-08 VITALS
SYSTOLIC BLOOD PRESSURE: 135 MMHG | OXYGEN SATURATION: 99 % | HEART RATE: 86 BPM | DIASTOLIC BLOOD PRESSURE: 76 MMHG | RESPIRATION RATE: 18 BRPM | TEMPERATURE: 98 F

## 2017-02-08 RX ADMIN — Medication 100 MILLIGRAM(S): at 05:50

## 2017-02-08 RX ADMIN — AMLODIPINE BESYLATE 5 MILLIGRAM(S): 2.5 TABLET ORAL at 05:50

## 2017-02-08 RX ADMIN — Medication 100 MILLIGRAM(S): at 17:24

## 2017-02-08 RX ADMIN — ENOXAPARIN SODIUM 40 MILLIGRAM(S): 100 INJECTION SUBCUTANEOUS at 12:29

## 2017-08-26 ENCOUNTER — EMERGENCY (EMERGENCY)
Facility: HOSPITAL | Age: 82
LOS: 1 days | Discharge: HOME | End: 2017-08-26
Attending: EMERGENCY MEDICINE | Admitting: EMERGENCY MEDICINE
Payer: MEDICARE

## 2017-08-26 VITALS
HEART RATE: 74 BPM | TEMPERATURE: 99 F | DIASTOLIC BLOOD PRESSURE: 76 MMHG | SYSTOLIC BLOOD PRESSURE: 149 MMHG | OXYGEN SATURATION: 99 % | RESPIRATION RATE: 18 BRPM

## 2017-08-26 VITALS
RESPIRATION RATE: 15 BRPM | HEART RATE: 72 BPM | DIASTOLIC BLOOD PRESSURE: 67 MMHG | SYSTOLIC BLOOD PRESSURE: 147 MMHG | OXYGEN SATURATION: 99 %

## 2017-08-26 PROBLEM — G30.9 ALZHEIMER'S DISEASE, UNSPECIFIED: Chronic | Status: ACTIVE | Noted: 2017-01-26

## 2017-08-26 LAB
ALBUMIN SERPL ELPH-MCNC: 4.4 G/DL — SIGNIFICANT CHANGE UP (ref 3.3–5)
ALP SERPL-CCNC: 104 U/L — SIGNIFICANT CHANGE UP (ref 40–120)
ALT FLD-CCNC: 11 U/L — SIGNIFICANT CHANGE UP (ref 4–33)
APPEARANCE UR: CLEAR — SIGNIFICANT CHANGE UP
APTT BLD: 33.2 SEC — SIGNIFICANT CHANGE UP (ref 27.5–37.4)
AST SERPL-CCNC: 21 U/L — SIGNIFICANT CHANGE UP (ref 4–32)
BASOPHILS # BLD AUTO: 0.05 K/UL — SIGNIFICANT CHANGE UP (ref 0–0.2)
BASOPHILS NFR BLD AUTO: 0.5 % — SIGNIFICANT CHANGE UP (ref 0–2)
BILIRUB SERPL-MCNC: 0.3 MG/DL — SIGNIFICANT CHANGE UP (ref 0.2–1.2)
BILIRUB UR-MCNC: NEGATIVE — SIGNIFICANT CHANGE UP
BLD GP AB SCN SERPL QL: NEGATIVE — SIGNIFICANT CHANGE UP
BLOOD UR QL VISUAL: HIGH
BUN SERPL-MCNC: 20 MG/DL — SIGNIFICANT CHANGE UP (ref 7–23)
CALCIUM SERPL-MCNC: 9.7 MG/DL — SIGNIFICANT CHANGE UP (ref 8.4–10.5)
CHLORIDE SERPL-SCNC: 107 MMOL/L — SIGNIFICANT CHANGE UP (ref 98–107)
CO2 SERPL-SCNC: 26 MMOL/L — SIGNIFICANT CHANGE UP (ref 22–31)
COLOR SPEC: YELLOW — SIGNIFICANT CHANGE UP
CREAT SERPL-MCNC: 0.89 MG/DL — SIGNIFICANT CHANGE UP (ref 0.5–1.3)
EOSINOPHIL # BLD AUTO: 0.99 K/UL — HIGH (ref 0–0.5)
EOSINOPHIL NFR BLD AUTO: 9.2 % — HIGH (ref 0–6)
GLUCOSE SERPL-MCNC: 125 MG/DL — HIGH (ref 70–99)
GLUCOSE UR-MCNC: NEGATIVE — SIGNIFICANT CHANGE UP
HCT VFR BLD CALC: 36.3 % — SIGNIFICANT CHANGE UP (ref 34.5–45)
HGB BLD-MCNC: 11.5 G/DL — SIGNIFICANT CHANGE UP (ref 11.5–15.5)
IMM GRANULOCYTES # BLD AUTO: 0.03 # — SIGNIFICANT CHANGE UP
IMM GRANULOCYTES NFR BLD AUTO: 0.3 % — SIGNIFICANT CHANGE UP (ref 0–1.5)
INR BLD: 1 — SIGNIFICANT CHANGE UP (ref 0.88–1.17)
KETONES UR-MCNC: NEGATIVE — SIGNIFICANT CHANGE UP
LEUKOCYTE ESTERASE UR-ACNC: NEGATIVE — SIGNIFICANT CHANGE UP
LYMPHOCYTES # BLD AUTO: 1.1 K/UL — SIGNIFICANT CHANGE UP (ref 1–3.3)
LYMPHOCYTES # BLD AUTO: 10.2 % — LOW (ref 13–44)
MCHC RBC-ENTMCNC: 30.4 PG — SIGNIFICANT CHANGE UP (ref 27–34)
MCHC RBC-ENTMCNC: 31.7 % — LOW (ref 32–36)
MCV RBC AUTO: 96 FL — SIGNIFICANT CHANGE UP (ref 80–100)
MONOCYTES # BLD AUTO: 0.53 K/UL — SIGNIFICANT CHANGE UP (ref 0–0.9)
MONOCYTES NFR BLD AUTO: 4.9 % — SIGNIFICANT CHANGE UP (ref 2–14)
MUCOUS THREADS # UR AUTO: SIGNIFICANT CHANGE UP
NEUTROPHILS # BLD AUTO: 8.11 K/UL — HIGH (ref 1.8–7.4)
NEUTROPHILS NFR BLD AUTO: 74.9 % — SIGNIFICANT CHANGE UP (ref 43–77)
NITRITE UR-MCNC: NEGATIVE — SIGNIFICANT CHANGE UP
NRBC # FLD: 0 — SIGNIFICANT CHANGE UP
PH UR: 6.5 — SIGNIFICANT CHANGE UP (ref 4.6–8)
PLATELET # BLD AUTO: 177 K/UL — SIGNIFICANT CHANGE UP (ref 150–400)
PMV BLD: 11.1 FL — SIGNIFICANT CHANGE UP (ref 7–13)
POTASSIUM SERPL-MCNC: 4.2 MMOL/L — SIGNIFICANT CHANGE UP (ref 3.5–5.3)
POTASSIUM SERPL-SCNC: 4.2 MMOL/L — SIGNIFICANT CHANGE UP (ref 3.5–5.3)
PROT SERPL-MCNC: 7.6 G/DL — SIGNIFICANT CHANGE UP (ref 6–8.3)
PROT UR-MCNC: 20 — SIGNIFICANT CHANGE UP
PROTHROM AB SERPL-ACNC: 11.2 SEC — SIGNIFICANT CHANGE UP (ref 9.8–13.1)
RBC # BLD: 3.78 M/UL — LOW (ref 3.8–5.2)
RBC # FLD: 14 % — SIGNIFICANT CHANGE UP (ref 10.3–14.5)
RBC CASTS # UR COMP ASSIST: HIGH (ref 0–?)
RH IG SCN BLD-IMP: NEGATIVE — SIGNIFICANT CHANGE UP
SODIUM SERPL-SCNC: 144 MMOL/L — SIGNIFICANT CHANGE UP (ref 135–145)
SP GR SPEC: 1.02 — SIGNIFICANT CHANGE UP (ref 1–1.03)
SQUAMOUS # UR AUTO: SIGNIFICANT CHANGE UP
UROBILINOGEN FLD QL: 2 E.U. — SIGNIFICANT CHANGE UP (ref 0.1–0.2)
WBC # BLD: 10.81 K/UL — HIGH (ref 3.8–10.5)
WBC # FLD AUTO: 10.81 K/UL — HIGH (ref 3.8–10.5)
WBC UR QL: SIGNIFICANT CHANGE UP (ref 0–?)

## 2017-08-26 PROCEDURE — 76857 US EXAM PELVIC LIMITED: CPT | Mod: 26

## 2017-08-26 PROCEDURE — 99284 EMERGENCY DEPT VISIT MOD MDM: CPT

## 2017-08-26 NOTE — ED PROVIDER NOTE - OBJECTIVE STATEMENT
82 yr old female with hx of CHF, colon CA, HTN, A fib on asa only presents to ed with family for vaginal bleeding spotting " couple of months" no other sx.  never seen a GYn for bleed.  not on ac but is on asa 81mg.  no fever, no significant weight loss, no night sweats, no cough, no rashes, no n/v/d.

## 2017-08-26 NOTE — ED ADULT NURSE NOTE - OBJECTIVE STATEMENT
Alert, awake, oriented x1, calm, following command.  Abdomen soft, non-tender, non-distended.  No active vaginal bleeding noted on pelvic exam by MD Dorsey.  IV accessed. Labs sent.  UA to be sent.  VSS.  Will continue to monitor.

## 2017-08-26 NOTE — ED PROVIDER NOTE - GENITOURINARY, MLM
No discharge, lesions. speculum- no vag bleeding noted, mucosa lining wnl, no sign of infection or necrotic tissue chaperone KATYA Gaffney

## 2017-08-26 NOTE — ED PROVIDER NOTE - CONDUCTED A DETAILED DISCUSSION WITH PATIENT AND/OR GUARDIAN REGARDING, MDM
lab results lab results/return to ED if symptoms worsen, persist or questions arise/need for outpatient follow-up/radiology results

## 2017-08-26 NOTE — ED ADULT NURSE NOTE - CHIEF COMPLAINT
The patient is a 82y Female brought in by family, complaining of vaginal spotting x months.  Pt denies dizziness, cp, sob, abd.pain.  Pmh of htn, dementia.  Family at the bedside.

## 2017-08-26 NOTE — ED PROVIDER NOTE - MEDICAL DECISION MAKING DETAILS
82 yr old female with hx of CHF, colon CA, HTN, A fib on asa only presents to ed with family for vaginal bleeding spotting " couple of months" no other sx.  never seen a GYn for bleed.  not on ac but is on asa 81mg.  no fever, no significant weight loss, no night sweats, no cough, no rashes, no n/v/d.    pt with chronic vaginal spotting will obtain labs and ua, if unremarkable will dc to f/u outpt gyn.

## 2018-01-03 ENCOUNTER — EMERGENCY (EMERGENCY)
Facility: HOSPITAL | Age: 83
LOS: 1 days | Discharge: HOME | End: 2018-01-03
Attending: EMERGENCY MEDICINE | Admitting: EMERGENCY MEDICINE
Payer: MEDICARE

## 2018-01-03 VITALS
DIASTOLIC BLOOD PRESSURE: 71 MMHG | RESPIRATION RATE: 16 BRPM | HEART RATE: 66 BPM | OXYGEN SATURATION: 99 % | TEMPERATURE: 98 F | SYSTOLIC BLOOD PRESSURE: 143 MMHG

## 2018-01-03 VITALS
RESPIRATION RATE: 18 BRPM | DIASTOLIC BLOOD PRESSURE: 69 MMHG | HEART RATE: 64 BPM | TEMPERATURE: 98 F | SYSTOLIC BLOOD PRESSURE: 115 MMHG | OXYGEN SATURATION: 100 %

## 2018-01-03 LAB
ALBUMIN SERPL ELPH-MCNC: 4.5 G/DL — SIGNIFICANT CHANGE UP (ref 3.3–5)
ALP SERPL-CCNC: 126 U/L — HIGH (ref 40–120)
ALT FLD-CCNC: 10 U/L — SIGNIFICANT CHANGE UP (ref 4–33)
APPEARANCE UR: CLEAR — SIGNIFICANT CHANGE UP
APTT BLD: 33 SEC — SIGNIFICANT CHANGE UP (ref 27.5–37.4)
AST SERPL-CCNC: 21 U/L — SIGNIFICANT CHANGE UP (ref 4–32)
BASOPHILS # BLD AUTO: 0.06 K/UL — SIGNIFICANT CHANGE UP (ref 0–0.2)
BASOPHILS NFR BLD AUTO: 0.6 % — SIGNIFICANT CHANGE UP (ref 0–2)
BILIRUB SERPL-MCNC: 0.4 MG/DL — SIGNIFICANT CHANGE UP (ref 0.2–1.2)
BILIRUB UR-MCNC: NEGATIVE — SIGNIFICANT CHANGE UP
BLOOD UR QL VISUAL: NEGATIVE — SIGNIFICANT CHANGE UP
BUN SERPL-MCNC: 13 MG/DL — SIGNIFICANT CHANGE UP (ref 7–23)
CALCIUM SERPL-MCNC: 9.8 MG/DL — SIGNIFICANT CHANGE UP (ref 8.4–10.5)
CHLORIDE SERPL-SCNC: 103 MMOL/L — SIGNIFICANT CHANGE UP (ref 98–107)
CK SERPL-CCNC: 85 U/L — SIGNIFICANT CHANGE UP (ref 25–170)
CO2 SERPL-SCNC: 30 MMOL/L — SIGNIFICANT CHANGE UP (ref 22–31)
COLOR SPEC: YELLOW — SIGNIFICANT CHANGE UP
CREAT SERPL-MCNC: 0.8 MG/DL — SIGNIFICANT CHANGE UP (ref 0.5–1.3)
EOSINOPHIL # BLD AUTO: 0.8 K/UL — HIGH (ref 0–0.5)
EOSINOPHIL NFR BLD AUTO: 7.7 % — HIGH (ref 0–6)
GLUCOSE SERPL-MCNC: 91 MG/DL — SIGNIFICANT CHANGE UP (ref 70–99)
GLUCOSE UR-MCNC: NEGATIVE — SIGNIFICANT CHANGE UP
HCT VFR BLD CALC: 39.9 % — SIGNIFICANT CHANGE UP (ref 34.5–45)
HGB BLD-MCNC: 12.9 G/DL — SIGNIFICANT CHANGE UP (ref 11.5–15.5)
HYALINE CASTS # UR AUTO: SIGNIFICANT CHANGE UP (ref 0–?)
IMM GRANULOCYTES # BLD AUTO: 0.02 # — SIGNIFICANT CHANGE UP
IMM GRANULOCYTES NFR BLD AUTO: 0.2 % — SIGNIFICANT CHANGE UP (ref 0–1.5)
INR BLD: 1 — SIGNIFICANT CHANGE UP (ref 0.88–1.17)
KETONES UR-MCNC: NEGATIVE — SIGNIFICANT CHANGE UP
LEUKOCYTE ESTERASE UR-ACNC: NEGATIVE — SIGNIFICANT CHANGE UP
LYMPHOCYTES # BLD AUTO: 1.53 K/UL — SIGNIFICANT CHANGE UP (ref 1–3.3)
LYMPHOCYTES # BLD AUTO: 14.7 % — SIGNIFICANT CHANGE UP (ref 13–44)
MCHC RBC-ENTMCNC: 30.2 PG — SIGNIFICANT CHANGE UP (ref 27–34)
MCHC RBC-ENTMCNC: 32.3 % — SIGNIFICANT CHANGE UP (ref 32–36)
MCV RBC AUTO: 93.4 FL — SIGNIFICANT CHANGE UP (ref 80–100)
MONOCYTES # BLD AUTO: 0.59 K/UL — SIGNIFICANT CHANGE UP (ref 0–0.9)
MONOCYTES NFR BLD AUTO: 5.7 % — SIGNIFICANT CHANGE UP (ref 2–14)
MUCOUS THREADS # UR AUTO: SIGNIFICANT CHANGE UP
NEUTROPHILS # BLD AUTO: 7.44 K/UL — HIGH (ref 1.8–7.4)
NEUTROPHILS NFR BLD AUTO: 71.1 % — SIGNIFICANT CHANGE UP (ref 43–77)
NITRITE UR-MCNC: NEGATIVE — SIGNIFICANT CHANGE UP
NRBC # FLD: 0 — SIGNIFICANT CHANGE UP
PH UR: 6.5 — SIGNIFICANT CHANGE UP (ref 4.6–8)
PLATELET # BLD AUTO: 230 K/UL — SIGNIFICANT CHANGE UP (ref 150–400)
PMV BLD: 11.3 FL — SIGNIFICANT CHANGE UP (ref 7–13)
POTASSIUM SERPL-MCNC: 4.2 MMOL/L — SIGNIFICANT CHANGE UP (ref 3.5–5.3)
POTASSIUM SERPL-SCNC: 4.2 MMOL/L — SIGNIFICANT CHANGE UP (ref 3.5–5.3)
PROT SERPL-MCNC: 8.2 G/DL — SIGNIFICANT CHANGE UP (ref 6–8.3)
PROT UR-MCNC: 10 MG/DL — SIGNIFICANT CHANGE UP
PROTHROM AB SERPL-ACNC: 11.1 SEC — SIGNIFICANT CHANGE UP (ref 9.8–13.1)
RBC # BLD: 4.27 M/UL — SIGNIFICANT CHANGE UP (ref 3.8–5.2)
RBC # FLD: 13.9 % — SIGNIFICANT CHANGE UP (ref 10.3–14.5)
RBC CASTS # UR COMP ASSIST: SIGNIFICANT CHANGE UP (ref 0–?)
SODIUM SERPL-SCNC: 144 MMOL/L — SIGNIFICANT CHANGE UP (ref 135–145)
SP GR SPEC: 1.02 — SIGNIFICANT CHANGE UP (ref 1–1.04)
TROPONIN T SERPL-MCNC: < 0.06 NG/ML — SIGNIFICANT CHANGE UP (ref 0–0.06)
UROBILINOGEN FLD QL: NORMAL MG/DL — SIGNIFICANT CHANGE UP
WBC # BLD: 10.44 K/UL — SIGNIFICANT CHANGE UP (ref 3.8–10.5)
WBC # FLD AUTO: 10.44 K/UL — SIGNIFICANT CHANGE UP (ref 3.8–10.5)
WBC UR QL: SIGNIFICANT CHANGE UP (ref 0–?)

## 2018-01-03 PROCEDURE — 99284 EMERGENCY DEPT VISIT MOD MDM: CPT

## 2018-01-03 PROCEDURE — 73523 X-RAY EXAM HIPS BI 5/> VIEWS: CPT | Mod: 26

## 2018-01-03 PROCEDURE — 72100 X-RAY EXAM L-S SPINE 2/3 VWS: CPT | Mod: 26

## 2018-01-03 NOTE — ED PROVIDER NOTE - ATTENDING CONTRIBUTION TO CARE
Huang: 82 yof with CHF, HTN, afib, ?uterine cancer (stage 2) as per son.  Pt has been unable to get to radiation therapy.  Son states she can only get out on house 1 a week.  Pt may have had fall 5 days ago because of complaints of back pain that resolved with tylenol.  On exam, pt is confused and awake, minimally cooperative.  No areas of tn, clear lungs.  moving all ext with deformity of lle.

## 2018-01-03 NOTE — ED ADULT TRIAGE NOTE - CHIEF COMPLAINT QUOTE
Pt arrives via EMS--pt c/o lower back pain since saturday--possible fall. Pt has alzemizers--doesnt remember. on states he found her sitting on the floor by the bed-no LOC  EKG-obtained. Pt has deformed lt leg from 40 year old fracture in that leg

## 2018-01-03 NOTE — SOCIAL WORK INITIAL EVALUATION ADULT - PLAN
SW met with 82 year old  female and her son, Carlos ([phone]) at bedside.     Patient is alert and oriented x 1.     Provided emotional support for their current ED visit and discussed access to community resources.     Patient lives with her son in a 2nd floor apartment.    There are 3 steps from the pavement to the front door and a flight a stairs to the apartment.    Patient does not use any medical equipment to assist with ambulation.    Patient has a home health aide that comes 7 days a week 6 hours a day from [facility] ([phone]).      at [facility] is Joleen Boudreaux.     Patient does not have a HCP, SW provided information to patient's son. MIKA met with 82 year old  female and her son, Carlos ([phone]) at bedside.     Patient is alert and oriented x 1 with h/o Alzheimer's.      Patient lives with her son in a 2nd floor apartment.    There are 3 steps from the pavement to the front door and a flight a stairs to the apartment.    Patient does not use any medical equipment to assist with ambulation.    Patient has a FLANAGAN who comes 7 days a week 6 hours a day from [facility] ([phone]).      at [facility] is Warren Nunn ([phone]).    MIKA left message for CM to start process of evaluation for increase in services at home.  Family to transport patient home today when ready for DC. MIKA met with 82 year old  female and her son, Carlos ([phone]) at bedside.     Patient is alert and oriented x 1 with h/o Alzheimer's.      Patient lives with her son in a 2nd floor apartment.    There are 3 steps from the pavement to the front door and a flight a stairs to the apartment.    Patient does not use any medical equipment to assist with ambulation.    Patient has a FLANAGAN who comes 7 days a week 6 hours a day from [facility].    at [facility] is Warren Nunn ([phone]).   [facility] ([phone]) is [facility].   Left message for [facility] to evaluate patient in the home for increase in services.  Family to transport patient home today when ready for DC. MIKA met with 82 year old  female and her son, Carlos ([phone]) at bedside.     Patient is alert and oriented x 1 with h/o Alzheimer's.      Patient lives with her son in a 2nd floor apartment.    There are 3 steps from the pavement to the front door and a flight a stairs to the apartment.    Patient does not use any medical equipment to assist with ambulation.    Patient has a FLANAGAN who comes 7 days a week 6 hours a day from [facility].    at [facility]  is Warren Nunn ([phone]).   [facility] ([phone]) is [facility].      Faxed ([phone]) D/C summary to [facility] Plan to Joleen, .      Joleen spoke with patient's son and informed him that they will be making a home visit next week for increase in services.   Family to transport patient home today when ready for DC.    Written by RADHA Sanders  Reviewed by Leandra Almodovar LMSW

## 2018-01-03 NOTE — ED PROVIDER NOTE - PROGRESS NOTE DETAILS
JOSE ferraro - pt with vag bleeding as well, as per son diagnosed with uterine ca (?) gyn - Dr Pearce [phone]; dr Carter radiation  [phone]. 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 Dr Pearce's office called, she arrange radio sessions close to pt's home - SW was consulted - pt already has HHA at home, will arrange transport to and from radiation and the HHA will go with the pt. Plan discussed with the son in the presence of SW, he agreed to the plan. No fx on xrays.

## 2018-01-03 NOTE — ED PROVIDER NOTE - MEDICAL DECISION MAKING DETAILS
s/p unwitnessed fall (questionable) 5 days ago, started c/o LBP yestarday, no signs of trauma or TTP on exam - well appearing, elderly female, alert to self only - labs, xray lumbar spine/pelvis/hips, pt denies any pain at the moment, will reasses.

## 2018-01-03 NOTE — ED PROVIDER NOTE - OBJECTIVE STATEMENT
Pt is 81 y/o female with PMhx of CHF, HTN, a-fib (not on AC, on asa only, as per chart), AD, colon Ca (?? - as per old charts) here for eval s/p possible unwitnessed fall 5 days ago. Pt lives with her son, she was found sitting on the floor by her son, in the bedroom, next to her bed. As per son pt wasn't complaining of any pain and therefore he hasn't brought the pt for eval sooner than today. As per son pt started c/o lower back pain yesterday, denies any HA, dizziness, denies cp, sob, palpitations before or after the accident, but pt is extremely poor historian secondary to dementia. Pt herself offers no complaints, ambulatory w/o assistance, needs some help since yesterday. Pt is 81 y/o female with PMhx of CHF, HTN, a-fib (not on AC, on asa only, as per chart), AD, uterine Ca (?? not sure??) here for eval s/p possible unwitnessed fall 5 days ago. Pt lives with her son, she was found sitting on the floor by her son, in the bedroom, next to her bed. As per son pt wasn't complaining of any pain and therefore he hasn't brought the pt for eval sooner than today. As per son pt started c/o lower back pain yesterday, denies any HA, dizziness, denies cp, sob, palpitations before or after the accident, but pt is extremely poor historian secondary to dementia. Pt herself offers no complaints, ambulatory w/o assistance, needs some help since yesterday.

## 2018-01-03 NOTE — ED PROVIDER NOTE - PLAN OF CARE
PLEASE MAKE SURE THAT START RADIATION SERIES AS DISCUSSED, FOLLOW UP WITH BOTH DR HARTMANN AND DR LEMOS AS DISCUSSED. YOU CAN TAKE TYLENOL 650MG EVERY 6HRS AS NEEDED FOR THE PAIN.

## 2018-01-03 NOTE — ED PROVIDER NOTE - PHYSICAL EXAMINATION
elderly, well appearing, Alert to self only  LLE - deformity noted, not new;   FROM of b/l hips  lumbar spine - no midline TTP, no deformity noted elderly, well appearing, Alert to self only  LLE - deformity noted, not new;   FROM of b/l hips  pelvic exam - pt with moderate amount of blood from vagina  lumbar spine - no midline TTP, no deformity noted

## 2018-01-03 NOTE — ED PROVIDER NOTE - CARE PLAN
Principal Discharge DX:	Uterine cancer  Instructions for follow-up, activity and diet:	PLEASE MAKE SURE THAT START RADIATION SERIES AS DISCUSSED, FOLLOW UP WITH BOTH DR HARTMANN AND DR LEMOS AS DISCUSSED. YOU CAN TAKE TYLENOL 650MG EVERY 6HRS AS NEEDED FOR THE PAIN.

## 2018-01-03 NOTE — ED ADULT NURSE NOTE - OBJECTIVE STATEMENT
pt in room 18 alert, confused vitals above  saline lock right ac 20 g angio  pt fell 4 days ago and has been having low back pain since then  son is at bedside  pt lives with son who states he has been giving her tylenol for the pain  pt denies pain at this time   labs sent straight cath for ua

## 2018-01-03 NOTE — SOCIAL WORK INITIAL EVALUATION ADULT - TRANSPORTATION UTILIZED PRIOR TO ADMISSION
family/friend provides transportation family/friend provides transportation/Medicaid transportation program

## 2018-01-04 LAB
BACTERIA UR CULT: SIGNIFICANT CHANGE UP
SPECIMEN SOURCE: SIGNIFICANT CHANGE UP

## 2018-09-01 ENCOUNTER — OUTPATIENT (OUTPATIENT)
Dept: OUTPATIENT SERVICES | Facility: HOSPITAL | Age: 83
LOS: 1 days | End: 2018-09-01

## 2018-09-26 ENCOUNTER — INPATIENT (INPATIENT)
Facility: HOSPITAL | Age: 83
LOS: 4 days | Discharge: HOSPICE | End: 2018-10-01
Attending: INTERNAL MEDICINE | Admitting: INTERNAL MEDICINE
Payer: MEDICARE

## 2018-09-26 VITALS
HEART RATE: 100 BPM | OXYGEN SATURATION: 100 % | SYSTOLIC BLOOD PRESSURE: 135 MMHG | TEMPERATURE: 100 F | DIASTOLIC BLOOD PRESSURE: 71 MMHG | RESPIRATION RATE: 16 BRPM

## 2018-09-26 DIAGNOSIS — D64.9 ANEMIA, UNSPECIFIED: ICD-10-CM

## 2018-09-26 LAB
ALBUMIN SERPL ELPH-MCNC: 3.8 G/DL — SIGNIFICANT CHANGE UP (ref 3.3–5)
ALP SERPL-CCNC: 87 U/L — SIGNIFICANT CHANGE UP (ref 40–120)
ALT FLD-CCNC: 7 U/L — SIGNIFICANT CHANGE UP (ref 4–33)
APPEARANCE UR: CLEAR — SIGNIFICANT CHANGE UP
AST SERPL-CCNC: 12 U/L — SIGNIFICANT CHANGE UP (ref 4–32)
BASE EXCESS BLDV CALC-SCNC: 2.2 MMOL/L — SIGNIFICANT CHANGE UP
BASOPHILS # BLD AUTO: 0 K/UL — SIGNIFICANT CHANGE UP (ref 0–0.2)
BASOPHILS NFR BLD AUTO: 0 % — SIGNIFICANT CHANGE UP (ref 0–2)
BILIRUB SERPL-MCNC: 0.3 MG/DL — SIGNIFICANT CHANGE UP (ref 0.2–1.2)
BILIRUB UR-MCNC: NEGATIVE — SIGNIFICANT CHANGE UP
BLD GP AB SCN SERPL QL: NEGATIVE — SIGNIFICANT CHANGE UP
BLOOD GAS VENOUS - CREATININE: 0.8 MG/DL — SIGNIFICANT CHANGE UP (ref 0.5–1.3)
BLOOD UR QL VISUAL: NEGATIVE — SIGNIFICANT CHANGE UP
BUN SERPL-MCNC: 16 MG/DL — SIGNIFICANT CHANGE UP (ref 7–23)
CALCIUM SERPL-MCNC: 8.9 MG/DL — SIGNIFICANT CHANGE UP (ref 8.4–10.5)
CHLORIDE BLDV-SCNC: > 120 MMOL/L — HIGH (ref 96–108)
CHLORIDE SERPL-SCNC: 120 MMOL/L — HIGH (ref 98–107)
CHLORIDE UR-SCNC: 40 MMOL/L — SIGNIFICANT CHANGE UP
CO2 SERPL-SCNC: 29 MMOL/L — SIGNIFICANT CHANGE UP (ref 22–31)
COLOR SPEC: YELLOW — SIGNIFICANT CHANGE UP
CREAT SERPL-MCNC: 0.82 MG/DL — SIGNIFICANT CHANGE UP (ref 0.5–1.3)
EOSINOPHIL # BLD AUTO: 0.12 K/UL — SIGNIFICANT CHANGE UP (ref 0–0.5)
EOSINOPHIL NFR BLD AUTO: 3.1 % — SIGNIFICANT CHANGE UP (ref 0–6)
GAS PNL BLDV: 157 MMOL/L — HIGH (ref 136–146)
GLUCOSE BLDV-MCNC: 104 — HIGH (ref 70–99)
GLUCOSE SERPL-MCNC: 105 MG/DL — HIGH (ref 70–99)
GLUCOSE UR-MCNC: NEGATIVE — SIGNIFICANT CHANGE UP
HCO3 BLDV-SCNC: 26 MMOL/L — SIGNIFICANT CHANGE UP (ref 20–27)
HCT VFR BLD CALC: 13.3 % — CRITICAL LOW (ref 34.5–45)
HCT VFR BLDV CALC: 11.8 % — CRITICAL LOW (ref 34.5–45)
HGB BLD-MCNC: 3.6 G/DL — CRITICAL LOW (ref 11.5–15.5)
HGB BLDV-MCNC: 3.6 G/DL — CRITICAL LOW (ref 11.5–15.5)
IMM GRANULOCYTES # BLD AUTO: 0.03 # — SIGNIFICANT CHANGE UP
IMM GRANULOCYTES NFR BLD AUTO: 0.8 % — SIGNIFICANT CHANGE UP (ref 0–1.5)
KETONES UR-MCNC: NEGATIVE — SIGNIFICANT CHANGE UP
LACTATE BLDV-MCNC: 1.2 MMOL/L — SIGNIFICANT CHANGE UP (ref 0.5–2)
LEUKOCYTE ESTERASE UR-ACNC: NEGATIVE — SIGNIFICANT CHANGE UP
LYMPHOCYTES # BLD AUTO: 0.63 K/UL — LOW (ref 1–3.3)
LYMPHOCYTES # BLD AUTO: 16.2 % — SIGNIFICANT CHANGE UP (ref 13–44)
MCHC RBC-ENTMCNC: 22.5 PG — LOW (ref 27–34)
MCHC RBC-ENTMCNC: 27.1 % — LOW (ref 32–36)
MCV RBC AUTO: 83.1 FL — SIGNIFICANT CHANGE UP (ref 80–100)
MONOCYTES # BLD AUTO: 0.23 K/UL — SIGNIFICANT CHANGE UP (ref 0–0.9)
MONOCYTES NFR BLD AUTO: 5.9 % — SIGNIFICANT CHANGE UP (ref 2–14)
NEUTROPHILS # BLD AUTO: 2.89 K/UL — SIGNIFICANT CHANGE UP (ref 1.8–7.4)
NEUTROPHILS NFR BLD AUTO: 74 % — SIGNIFICANT CHANGE UP (ref 43–77)
NITRITE UR-MCNC: NEGATIVE — SIGNIFICANT CHANGE UP
NRBC # FLD: 0.04 — SIGNIFICANT CHANGE UP
NRBC FLD-RTO: 1 — SIGNIFICANT CHANGE UP
OSMOLALITY SERPL: 322 MOSMO/KG — HIGH (ref 275–295)
OSMOLALITY UR: 548 MOSMO/KG — SIGNIFICANT CHANGE UP (ref 50–1200)
PCO2 BLDV: 49 MMHG — SIGNIFICANT CHANGE UP (ref 41–51)
PH BLDV: 7.36 PH — SIGNIFICANT CHANGE UP (ref 7.32–7.43)
PH UR: 5.5 — SIGNIFICANT CHANGE UP (ref 5–8)
PLATELET # BLD AUTO: 194 K/UL — SIGNIFICANT CHANGE UP (ref 150–400)
PMV BLD: SIGNIFICANT CHANGE UP FL (ref 7–13)
PO2 BLDV: 46 MMHG — HIGH (ref 35–40)
POTASSIUM BLDV-SCNC: 3.9 MMOL/L — SIGNIFICANT CHANGE UP (ref 3.4–4.5)
POTASSIUM SERPL-MCNC: 4 MMOL/L — SIGNIFICANT CHANGE UP (ref 3.5–5.3)
POTASSIUM SERPL-SCNC: 4 MMOL/L — SIGNIFICANT CHANGE UP (ref 3.5–5.3)
POTASSIUM UR-SCNC: 85.2 MMOL/L — SIGNIFICANT CHANGE UP
PROT SERPL-MCNC: 6.6 G/DL — SIGNIFICANT CHANGE UP (ref 6–8.3)
PROT UR-MCNC: 20 — SIGNIFICANT CHANGE UP
RBC # BLD: 1.6 M/UL — LOW (ref 3.8–5.2)
RBC # FLD: SIGNIFICANT CHANGE UP % (ref 10.3–14.5)
REVIEW TO FOLLOW: YES — SIGNIFICANT CHANGE UP
RH IG SCN BLD-IMP: NEGATIVE — SIGNIFICANT CHANGE UP
SAO2 % BLDV: 68.2 % — SIGNIFICANT CHANGE UP (ref 60–85)
SODIUM SERPL-SCNC: 158 MMOL/L — HIGH (ref 135–145)
SODIUM UR-SCNC: < 20 MMOL/L — SIGNIFICANT CHANGE UP
SP GR SPEC: 1.02 — SIGNIFICANT CHANGE UP (ref 1–1.04)
UROBILINOGEN FLD QL: SIGNIFICANT CHANGE UP
WBC # BLD: 3.9 K/UL — SIGNIFICANT CHANGE UP (ref 3.8–10.5)
WBC # FLD AUTO: 3.9 K/UL — SIGNIFICANT CHANGE UP (ref 3.8–10.5)

## 2018-09-26 PROCEDURE — 71045 X-RAY EXAM CHEST 1 VIEW: CPT | Mod: 26

## 2018-09-26 NOTE — ED ADULT NURSE NOTE - NSIMPLEMENTINTERV_GEN_ALL_ED
Implemented All Fall Risk Interventions:  [place] to call system. Call bell, personal items and telephone within reach. Instruct patient to call for assistance. Room bathroom lighting operational. Non-slip footwear when patient is off stretcher. Physically safe environment: no spills, clutter or unnecessary equipment. Stretcher in lowest position, wheels locked, appropriate side rails in place. Provide visual cue, wrist band, yellow gown, etc. Monitor gait and stability. Monitor for mental status changes and reorient to person, place, and time. Review medications for side effects contributing to fall risk. Reinforce activity limits and safety measures with patient and family.

## 2018-09-26 NOTE — ED PROVIDER NOTE - OBJECTIVE STATEMENT
83 F with alzheimers dementia A&Ox1 at baseline. In USOH 4d ago, since then been more sleepy and having decreased PO, decreased interaction with family, and having belly pain. NO recent falls per famiy. Seen at PCP office yesterday for these sx, bloodwork showed Na 157 so told tocome in. No n/v/d, reported dysuria however pt is a poor historian (hx from family at bedside), CP/SOB. Was given amox-clavulanate at that visit for a "fever" per family. Spoike to someone, not her PCP who is Navneet Parmar, who said her lab values from yesterday were Na 157, K 3.9, Ca 9.2 Cr .76, BUN 17 Cl 119 , Glu 108 from labs yesterday. 83 F with alzheimers dementia A&Ox1 at baseline. In USOH 4d ago, since then been more sleepy and having decreased PO, decreased interaction with family, and having belly pain. NO recent falls per famiy. Seen at PCP office yesterday for these sx, bloodwork showed Na 157 so told tocome in. No n/v/d, reported dysuria however pt is a poor historian (hx from family at bedside), CP/SOB. Was given amox-clavulanate at that visit for a "fever" per family. Spoike to someone, not her PCP who is Navneet Parmar, who said her lab values from yesterday were Na 157, K 3.9, Ca 9.2 Cr .76, BUN 17 Cl 119 , Glu 108 from labs yesterday.    Pt has non-operable endometrial cancer and family reports intermittent vaginal bleeding x weeks.

## 2018-09-26 NOTE — ED ADULT TRIAGE NOTE - CHIEF COMPLAINT QUOTE
Patient arrives with ems from home , sent in by her pmd, she had blood work yesterday and her sodium is elevated.  As per her family the patient is more sleepy and had a fever.

## 2018-09-26 NOTE — ED PROVIDER NOTE - MEDICAL DECISION MAKING DETAILS
83 F with dementia presenting for hypernatremia from an outpatient visit. Exam non contributory as patient is altered at baseline and appears to be worse than baseline per family. Will order labs, ekg, UA and Ulytes, will likely need admission as patient is eating less than usual w/ abnormal lab values.

## 2018-09-26 NOTE — ED PROVIDER NOTE - ATTENDING CONTRIBUTION TO CARE
Dr. Pitts:  I have personally performed a face to face bedside history and physical examination of this patient. I have discussed the history, examination, review of systems, assessment and plan of management with the resident. I have reviewed the electronic medical record and amended it to reflect my history, review of systems, physical exam, assessment and plan.    83F h/o HTN, cervical cancer s/p radiation, Alzheimer's dementia, saw PCP yesterday for feeling sleepy than usual over past 4 days.  Decreased PO intake and decreased interaction with family.  Placed on amoxicillin yesterday because she had fever. Called to come to ED because yesterday's labs showed sodium of 157    Exam:  - nad  - rrr, systolic murmur  - ctab but poor effort  - abd soft, non-tender    A/P  - hypernatremia, eval infection also  - cbc, cmp, vbg, coags, ,osm, ua, urine culture  - ekg  - cxr

## 2018-09-26 NOTE — ED PROVIDER NOTE - PHYSICAL EXAMINATION
General: No apparent distress  Head: Normocephalic and atraumatic.  Neck: Supple. Trachea midline.   Cardiac: Normal S1 and S2 w/ RRR.  Systolic murmur throughout precodrium   Pulmonary: Vesicular breath sounds bilaterally. No increased WOB. No wheezes or crackles.  Abdominal: Soft, non-tender. (+) bowel sounds appreciated in all 4 quadrants.   Neurologic: No focal sensory or motor deficits.  Musculoskeletal: Strength appropriate in all 4 extremities for age with no limited ROM.  Skin: Color appropriate for race. Intact, warm, and well-perfused.  Psychiatric: Appropriate mood and affect. No apparent risk to self or others. General: No apparent distress  Head: Normocephalic and atraumatic.  Neck: Supple. Trachea midline.   Cardiac: Normal S1 and S2 w/ RRR.  Systolic murmur throughout precodrium   Pulmonary: PT not cooperative with exam, breath sounds didficult to appreciate but no adventitious lung sounds on my exam  Abdominal: Soft, non-tender. (+) bowel sounds appreciated in all 4 quadrants.   Neurologic:  A&Ox0, pt not cooperative with exam.  Musculoskeletal: Chronic skin darkening of LE. L shin with chronic deformity of the shin. 2+ DP pulses

## 2018-09-27 DIAGNOSIS — G30.9 ALZHEIMER'S DISEASE, UNSPECIFIED: ICD-10-CM

## 2018-09-27 DIAGNOSIS — Z79.899 OTHER LONG TERM (CURRENT) DRUG THERAPY: ICD-10-CM

## 2018-09-27 DIAGNOSIS — G30.1 ALZHEIMER'S DISEASE WITH LATE ONSET: ICD-10-CM

## 2018-09-27 DIAGNOSIS — D64.9 ANEMIA, UNSPECIFIED: ICD-10-CM

## 2018-09-27 DIAGNOSIS — E87.0 HYPEROSMOLALITY AND HYPERNATREMIA: ICD-10-CM

## 2018-09-27 DIAGNOSIS — Z51.5 ENCOUNTER FOR PALLIATIVE CARE: ICD-10-CM

## 2018-09-27 DIAGNOSIS — Z29.9 ENCOUNTER FOR PROPHYLACTIC MEASURES, UNSPECIFIED: ICD-10-CM

## 2018-09-27 DIAGNOSIS — R41.82 ALTERED MENTAL STATUS, UNSPECIFIED: ICD-10-CM

## 2018-09-27 DIAGNOSIS — R53.81 OTHER MALAISE: ICD-10-CM

## 2018-09-27 DIAGNOSIS — C54.1 MALIGNANT NEOPLASM OF ENDOMETRIUM: ICD-10-CM

## 2018-09-27 PROBLEM — I48.91 UNSPECIFIED ATRIAL FIBRILLATION: Chronic | Status: ACTIVE | Noted: 2018-01-03

## 2018-09-27 LAB
ALBUMIN SERPL ELPH-MCNC: 3.6 G/DL — SIGNIFICANT CHANGE UP (ref 3.3–5)
ALBUMIN SERPL ELPH-MCNC: 3.7 G/DL — SIGNIFICANT CHANGE UP (ref 3.3–5)
ALP SERPL-CCNC: 89 U/L — SIGNIFICANT CHANGE UP (ref 40–120)
ALP SERPL-CCNC: 92 U/L — SIGNIFICANT CHANGE UP (ref 40–120)
ALT FLD-CCNC: 5 U/L — SIGNIFICANT CHANGE UP (ref 4–33)
ALT FLD-CCNC: 7 U/L — SIGNIFICANT CHANGE UP (ref 4–33)
ANISOCYTOSIS BLD QL: SLIGHT — SIGNIFICANT CHANGE UP
AST SERPL-CCNC: 14 U/L — SIGNIFICANT CHANGE UP (ref 4–32)
AST SERPL-CCNC: 15 U/L — SIGNIFICANT CHANGE UP (ref 4–32)
BASOPHILS # BLD AUTO: 0.01 K/UL — SIGNIFICANT CHANGE UP (ref 0–0.2)
BASOPHILS # BLD AUTO: 0.02 K/UL — SIGNIFICANT CHANGE UP (ref 0–0.2)
BASOPHILS NFR BLD AUTO: 0.2 % — SIGNIFICANT CHANGE UP (ref 0–2)
BASOPHILS NFR BLD AUTO: 0.5 % — SIGNIFICANT CHANGE UP (ref 0–2)
BASOPHILS NFR SPEC: 0 % — SIGNIFICANT CHANGE UP (ref 0–2)
BILIRUB SERPL-MCNC: 1.5 MG/DL — HIGH (ref 0.2–1.2)
BILIRUB SERPL-MCNC: 1.8 MG/DL — HIGH (ref 0.2–1.2)
BUN SERPL-MCNC: 15 MG/DL — SIGNIFICANT CHANGE UP (ref 7–23)
BUN SERPL-MCNC: 15 MG/DL — SIGNIFICANT CHANGE UP (ref 7–23)
BUN SERPL-MCNC: 16 MG/DL — SIGNIFICANT CHANGE UP (ref 7–23)
CALCIUM SERPL-MCNC: 8.8 MG/DL — SIGNIFICANT CHANGE UP (ref 8.4–10.5)
CALCIUM SERPL-MCNC: 9 MG/DL — SIGNIFICANT CHANGE UP (ref 8.4–10.5)
CALCIUM SERPL-MCNC: 9 MG/DL — SIGNIFICANT CHANGE UP (ref 8.4–10.5)
CHLORIDE SERPL-SCNC: 117 MMOL/L — HIGH (ref 98–107)
CHLORIDE SERPL-SCNC: 118 MMOL/L — HIGH (ref 98–107)
CHLORIDE SERPL-SCNC: 119 MMOL/L — HIGH (ref 98–107)
CO2 SERPL-SCNC: 27 MMOL/L — SIGNIFICANT CHANGE UP (ref 22–31)
CO2 SERPL-SCNC: 28 MMOL/L — SIGNIFICANT CHANGE UP (ref 22–31)
CO2 SERPL-SCNC: 28 MMOL/L — SIGNIFICANT CHANGE UP (ref 22–31)
CREAT SERPL-MCNC: 0.73 MG/DL — SIGNIFICANT CHANGE UP (ref 0.5–1.3)
CREAT SERPL-MCNC: 0.76 MG/DL — SIGNIFICANT CHANGE UP (ref 0.5–1.3)
CREAT SERPL-MCNC: 0.78 MG/DL — SIGNIFICANT CHANGE UP (ref 0.5–1.3)
EOSINOPHIL # BLD AUTO: 0.08 K/UL — SIGNIFICANT CHANGE UP (ref 0–0.5)
EOSINOPHIL # BLD AUTO: 0.1 K/UL — SIGNIFICANT CHANGE UP (ref 0–0.5)
EOSINOPHIL NFR BLD AUTO: 1.6 % — SIGNIFICANT CHANGE UP (ref 0–6)
EOSINOPHIL NFR BLD AUTO: 2.3 % — SIGNIFICANT CHANGE UP (ref 0–6)
EOSINOPHIL NFR FLD: 3 % — SIGNIFICANT CHANGE UP (ref 0–6)
GLUCOSE SERPL-MCNC: 102 MG/DL — HIGH (ref 70–99)
GLUCOSE SERPL-MCNC: 128 MG/DL — HIGH (ref 70–99)
GLUCOSE SERPL-MCNC: 93 MG/DL — SIGNIFICANT CHANGE UP (ref 70–99)
HCT VFR BLD CALC: 20 % — CRITICAL LOW (ref 34.5–45)
HCT VFR BLD CALC: 21.1 % — LOW (ref 34.5–45)
HGB BLD-MCNC: 6.3 G/DL — CRITICAL LOW (ref 11.5–15.5)
HGB BLD-MCNC: 6.6 G/DL — CRITICAL LOW (ref 11.5–15.5)
HYPOCHROMIA BLD QL: SIGNIFICANT CHANGE UP
IMM GRANULOCYTES # BLD AUTO: 0.05 # — SIGNIFICANT CHANGE UP
IMM GRANULOCYTES # BLD AUTO: 0.05 # — SIGNIFICANT CHANGE UP
IMM GRANULOCYTES NFR BLD AUTO: 1 % — SIGNIFICANT CHANGE UP (ref 0–1.5)
IMM GRANULOCYTES NFR BLD AUTO: 1.1 % — SIGNIFICANT CHANGE UP (ref 0–1.5)
LYMPHOCYTES # BLD AUTO: 0.65 K/UL — LOW (ref 1–3.3)
LYMPHOCYTES # BLD AUTO: 0.75 K/UL — LOW (ref 1–3.3)
LYMPHOCYTES # BLD AUTO: 13.3 % — SIGNIFICANT CHANGE UP (ref 13–44)
LYMPHOCYTES # BLD AUTO: 16.9 % — SIGNIFICANT CHANGE UP (ref 13–44)
LYMPHOCYTES NFR SPEC AUTO: 20 % — SIGNIFICANT CHANGE UP (ref 13–44)
MAGNESIUM SERPL-MCNC: 2.2 MG/DL — SIGNIFICANT CHANGE UP (ref 1.6–2.6)
MANUAL SMEAR VERIFICATION: SIGNIFICANT CHANGE UP
MANUAL SMEAR VERIFICATION: SIGNIFICANT CHANGE UP
MCHC RBC-ENTMCNC: 25.8 PG — LOW (ref 27–34)
MCHC RBC-ENTMCNC: 26.3 PG — LOW (ref 27–34)
MCHC RBC-ENTMCNC: 31.3 % — LOW (ref 32–36)
MCHC RBC-ENTMCNC: 31.5 % — LOW (ref 32–36)
MCV RBC AUTO: 82.4 FL — SIGNIFICANT CHANGE UP (ref 80–100)
MCV RBC AUTO: 83.3 FL — SIGNIFICANT CHANGE UP (ref 80–100)
MONOCYTES # BLD AUTO: 0.39 K/UL — SIGNIFICANT CHANGE UP (ref 0–0.9)
MONOCYTES # BLD AUTO: 0.41 K/UL — SIGNIFICANT CHANGE UP (ref 0–0.9)
MONOCYTES NFR BLD AUTO: 8.4 % — SIGNIFICANT CHANGE UP (ref 2–14)
MONOCYTES NFR BLD AUTO: 8.8 % — SIGNIFICANT CHANGE UP (ref 2–14)
MONOCYTES NFR BLD: 4 % — SIGNIFICANT CHANGE UP (ref 2–9)
NEUTROPHIL AB SER-ACNC: 73 % — SIGNIFICANT CHANGE UP (ref 43–77)
NEUTROPHILS # BLD AUTO: 3.13 K/UL — SIGNIFICANT CHANGE UP (ref 1.8–7.4)
NEUTROPHILS # BLD AUTO: 3.7 K/UL — SIGNIFICANT CHANGE UP (ref 1.8–7.4)
NEUTROPHILS NFR BLD AUTO: 70.4 % — SIGNIFICANT CHANGE UP (ref 43–77)
NEUTROPHILS NFR BLD AUTO: 75.5 % — SIGNIFICANT CHANGE UP (ref 43–77)
NRBC # BLD: 0 /100WBC — SIGNIFICANT CHANGE UP
NRBC # FLD: 0.04 — SIGNIFICANT CHANGE UP
NRBC # FLD: 0.07 — SIGNIFICANT CHANGE UP
NRBC FLD-RTO: 1.6 — SIGNIFICANT CHANGE UP
PHOSPHATE SERPL-MCNC: 3.4 MG/DL — SIGNIFICANT CHANGE UP (ref 2.5–4.5)
PLATELET # BLD AUTO: 156 K/UL — SIGNIFICANT CHANGE UP (ref 150–400)
PLATELET # BLD AUTO: 165 K/UL — SIGNIFICANT CHANGE UP (ref 150–400)
PLATELET COUNT - ESTIMATE: NORMAL — SIGNIFICANT CHANGE UP
PMV BLD: 11.7 FL — SIGNIFICANT CHANGE UP (ref 7–13)
PMV BLD: 12.5 FL — SIGNIFICANT CHANGE UP (ref 7–13)
POTASSIUM SERPL-MCNC: 3.6 MMOL/L — SIGNIFICANT CHANGE UP (ref 3.5–5.3)
POTASSIUM SERPL-MCNC: 3.7 MMOL/L — SIGNIFICANT CHANGE UP (ref 3.5–5.3)
POTASSIUM SERPL-MCNC: 3.8 MMOL/L — SIGNIFICANT CHANGE UP (ref 3.5–5.3)
POTASSIUM SERPL-SCNC: 3.6 MMOL/L — SIGNIFICANT CHANGE UP (ref 3.5–5.3)
POTASSIUM SERPL-SCNC: 3.7 MMOL/L — SIGNIFICANT CHANGE UP (ref 3.5–5.3)
POTASSIUM SERPL-SCNC: 3.8 MMOL/L — SIGNIFICANT CHANGE UP (ref 3.5–5.3)
PROT SERPL-MCNC: 6.7 G/DL — SIGNIFICANT CHANGE UP (ref 6–8.3)
PROT SERPL-MCNC: 6.7 G/DL — SIGNIFICANT CHANGE UP (ref 6–8.3)
RBC # BLD: 2.4 M/UL — LOW (ref 3.8–5.2)
RBC # BLD: 2.56 M/UL — LOW (ref 3.8–5.2)
RBC # FLD: 15.4 % — HIGH (ref 10.3–14.5)
RBC # FLD: 15.9 % — HIGH (ref 10.3–14.5)
SODIUM SERPL-SCNC: 155 MMOL/L — HIGH (ref 135–145)
SODIUM SERPL-SCNC: 156 MMOL/L — HIGH (ref 135–145)
SODIUM SERPL-SCNC: 157 MMOL/L — HIGH (ref 135–145)
WBC # BLD: 4.44 K/UL — SIGNIFICANT CHANGE UP (ref 3.8–10.5)
WBC # BLD: 4.9 K/UL — SIGNIFICANT CHANGE UP (ref 3.8–10.5)
WBC # FLD AUTO: 4.44 K/UL — SIGNIFICANT CHANGE UP (ref 3.8–10.5)
WBC # FLD AUTO: 4.9 K/UL — SIGNIFICANT CHANGE UP (ref 3.8–10.5)

## 2018-09-27 PROCEDURE — 99223 1ST HOSP IP/OBS HIGH 75: CPT | Mod: GC

## 2018-09-27 RX ORDER — SODIUM CHLORIDE 9 MG/ML
1000 INJECTION, SOLUTION INTRAVENOUS
Qty: 0 | Refills: 0 | Status: DISCONTINUED | OUTPATIENT
Start: 2018-09-27 | End: 2018-09-28

## 2018-09-27 RX ADMIN — SODIUM CHLORIDE 50 MILLILITER(S): 9 INJECTION, SOLUTION INTRAVENOUS at 06:59

## 2018-09-27 NOTE — H&P ADULT - PROBLEM SELECTOR PLAN 3
Likely in setting of metabolic derangements, advanced dementia and anemia  - Continue to gently correct sodium and anemia  - Continue gentle reorientation of patient (does not appear to be agitated or in any distress)

## 2018-09-27 NOTE — H&P ADULT - ASSESSMENT
82 yo F PMHx Alzheimer's dementia (AxO1 baseline), non-operable endometrial cancer w/ intermittent vaginal bleeding, CHF, HTN, atrial fibrillation, at usual state of health 4 days ago but has subsequently increased somnolence and with decreased PO intake in setting of worsening anemia and hypernatremia.

## 2018-09-27 NOTE — CHART NOTE - NSCHARTNOTEFT_GEN_A_CORE
Spoke to medicine attending Dr Delacruz in regards to patient's most recent blood work as well as family members requesting to speak to a doctor. Writer ordered a 3rd unit of PRBCs as discussed with Dr. Delacruz in review of the patient's bloodwork and pending his evaluation prior to any further interventions. Family member was told and updated regarding this status.

## 2018-09-27 NOTE — H&P ADULT - PROBLEM SELECTOR PLAN 6
Please obtain collateral information and medication list from family. Attempted to call both numbers on file without success.

## 2018-09-27 NOTE — CONSULT NOTE ADULT - PROBLEM SELECTOR RECOMMENDATION 4
overall prognosis is poor as pt has endometrial ca that is untreated  pt has been bleeding on/off for a while as per son  transfusions as tolerated to hg around 7  discussed prognosis with son over the phone and rec hospice eval  hospice called  will follow up in am

## 2018-09-27 NOTE — CONSULT NOTE ADULT - ASSESSMENT
82 yo female with advanced alzheimer's dementia and endometrial ca admitted from home after being told blood work was abnormal

## 2018-09-27 NOTE — ED ADULT NURSE REASSESSMENT NOTE - NS ED NURSE REASSESS COMMENT FT1
break coverage RN- blood transfusion complete, pt in NAD, no transfusion reaction noted, no infiltration noted to IV in right arm, Vitals as noted, will continue to monitor

## 2018-09-27 NOTE — H&P ADULT - PROBLEM SELECTOR PLAN 4
Per documentation has advanced cancer unable to be resected.   - Continue discussions with family regarding goals of care  - Consider palliative consult in AM if family is amendable

## 2018-09-27 NOTE — H&P ADULT - NSHPPHYSICALEXAM_GEN_ALL_CORE
Vital Signs Last 24 Hrs  T(C): 36.7 (27 Sep 2018 01:13), Max: 37.5 (26 Sep 2018 17:28)  T(F): 98 (27 Sep 2018 01:13), Max: 99.5 (26 Sep 2018 17:28)  HR: 74 (27 Sep 2018 01:13) (74 - 100)  BP: 144/80 (27 Sep 2018 01:13) (112/85 - 166/82)  BP(mean): --  RR: 18 (27 Sep 2018 01:13) (16 - 18)  SpO2: 100% (27 Sep 2018 01:13) (100% - 100%)  I&O's Summary      General: No acute distress, laying in the stretcher comfortably  Head: Atraumatic, normocephalic  ENMT: No cervical or supraclavicular lymphadenopathy  Chest/lung: Normal appearance, no accessory muscle use, decreased breath sounds throughout, clear to auscultation bilaterally, no rales/rhonchi or wheezing  Cardio: Regular rate and rhythm, 3/6 holosystolic murmur, no rubs or gallops.  Abdomen: Soft, nondistended, bowel sounds present, no guarding or rigidity, possible tenderness as patient would push away hand  : No lynch catheter  Extremities: No edema   Neuro: Alert and oriented x0, moving spontaneously  Psych: calm  Skin: No rashes or lesions

## 2018-09-27 NOTE — ED ADULT NURSE REASSESSMENT NOTE - NS ED NURSE REASSESS COMMENT FT1
Report endorsed to [facility] 2 RN. PT in no acute distress. At baseline mental status at this time. Activated stat labs sent, cbc and cmp.  ivf infusing as per order. Incontinent care provided. pt in spot near nurses station in essu 2.

## 2018-09-27 NOTE — CONSULT NOTE ADULT - PROBLEM SELECTOR RECOMMENDATION 9
pt house bound cared for by her son with help from [facility] 7 days a week 7 hours  pt had visiting md recently come to care for pt

## 2018-09-27 NOTE — H&P ADULT - PROBLEM SELECTOR PLAN 7
- DVT ppx: SCDs  - Keep NPO given concern of AMS and aspiration risk. Please discuss with family pleasure feeds.   - Fall precautions    Mima Carpenter MD  Internal Medicine, PGY-2  Pager [phone]

## 2018-09-27 NOTE — H&P ADULT - HISTORY OF PRESENT ILLNESS
While in the ED: 84 yo F PMHx Alzheimer's dementia (AxO1 baseline), non-operable endometrial cancer w/ intermittent vaginal bleeding, at usual state of health 4 days ago but has subsequently appeared more sleepy and with decreased PO intake.     Per chart review: Patient arrived by EMS from home. She was sent in by her PMD due to high sodium. She has appeared to be more sleepy than usual and has had a fever.     She was in her usual state of health 4 days ago and since has been more sleepy and has had decreased PO intake. She has had decreased interaction with family and abdominal pain. No recent falls. Was seen at PCP's office (Dr. Ama Parmar) for these symptoms and bloodwork showed a Na of 157 and was told to go the the ED.     No nausea/vomting or diarrhea, however may have had dysuria. She was given augmentin during that visit. She may have had a fever.     On assessing patient:   Patient was AxOx0. She would verbalize at times but not intelligibly. She would regard and push away hand but was mostly nonverbal. She did not appear to be in any acute distress or have any labored breathing.     While in the ED: T99.5, 135/71, 100, 16, 100% RA. She has been ordered for two units of pRBCs. The second is pending. Per ED notes goals of care was discussed with family Nasra Rivera (daughter) who had not addressed GOC previously and wanted time to discuss with other family members. 82 yo F PMHx Alzheimer's dementia (AxO1 baseline), non-operable endometrial cancer w/ intermittent vaginal bleeding, CHF, HTN, atrial fibrillation, at usual state of health 4 days ago but has subsequently appeared more sleepy and with decreased PO intake.     Per chart review: Patient arrived by EMS from home. She was sent in by her PMD due to high sodium. She has appeared to be more sleepy than usual and has had a fever.     She was in her usual state of health 4 days ago and since has been more sleepy and has had decreased PO intake. She has had decreased interaction with family and abdominal pain. No recent falls. Was seen at PCP's office (Dr. Ama Parmar) for these symptoms and bloodwork showed a Na of 157 and was told to go the the ED.     No nausea/vomting or diarrhea, however may have had dysuria. She was given augmentin during that visit. She may have had a fever.     On assessing patient:   Patient was AxOx0. She would verbalize at times but not intelligibly. She would regard and push away hand but was mostly nonverbal. She did not appear to be in any acute distress or have any labored breathing. Unable to reach family for collateral information (attempted both numbers on file and physical chart without documentation of family information).     While in the ED: T99.5, 135/71, 100, 16, 100% RA. She has been ordered for two units of pRBCs. The second is pending. Per ED notes goals of care was discussed with family Nasra Rivera (daughter) who had not addressed GOC previously and wanted time to discuss with other family members.

## 2018-09-27 NOTE — CONSULT NOTE ADULT - SUBJECTIVE AND OBJECTIVE BOX
HPI:  82 yo F PMHx Alzheimer's dementia (AxO1 baseline), non-operable endometrial cancer w/ intermittent vaginal bleeding, CHF, HTN, atrial fibrillation, at usual state of health 4 days ago but has subsequently appeared more sleepy and with decreased PO intake.     Per chart review: Patient arrived by EMS from home. She was sent in by her PMD due to high sodium. She has appeared to be more sleepy than usual and has had a fever.     She was in her usual state of health 4 days ago and since has been more sleepy and has had decreased PO intake. She has had decreased interaction with family and abdominal pain. No recent falls. Was seen at PCP's office (Dr. Ama Parmar) for these symptoms and bloodwork showed a Na of 157 and was told to go the the ED.     No nausea/vomting or diarrhea, however may have had dysuria. She was given augmentin during that visit. She may have had a fever.     On assessing patient:   Patient was AxOx0. She would verbalize at times but not intelligibly. She would regard and push away hand but was mostly nonverbal. She did not appear to be in any acute distress or have any labored breathing. Unable to reach family for collateral information (attempted both numbers on file and physical chart without documentation of family information).     Pt seen in ED.  Pt non verbal.  Appears comfortable.      Spoke to son over the phone who is primary caregiver    PERTINENT PM/SXH:   Endometrial cancer  Atrial fibrillation  Alzheimers disease  Hypertension    S/P tubal ligation  No significant past surgical history    FAMILY HISTORY:  No pertinent family history    ITEMS NOT CHECKED ARE NOT PRESENT    SOCIAL HISTORY:   Significant other/partner:  [ ]  Children:  [x ]  Synagogue/Spirituality:  Substance hx:  [ ]   Tobacco hx:  [ ]   Alcohol hx: [ ]   Home Opioid hx:  [ ] I-Stop Reference No:  Living Situation: [ x]Home  [ ]Long term care  [ ]Rehab [ ]Other  7 hours a day / 7 days hha through medicaid    ADVANCE DIRECTIVES:    DNR  MOLST  [ ]  Living Will  [ ]   DECISION MAKER(s):  [ ] Health Care Proxy(s)  [ ] Surrogate(s)  [ ] Guardian           Name(s): Phone Number(s):    BASELINE (I)ADL(s) (prior to admission):  [place]: [ ]Total  [ ] Moderate [x ]Dependent    Allergies    No Known Allergies    Intolerances    MEDICATIONS  (STANDING):  dextrose 5%. 1000 milliLiter(s) (50 mL/Hr) IV Continuous <Continuous>    MEDICATIONS  (PRN):    PRESENT SYMPTOMS: [x ]Unable to obtain due to poor mentation   Source if other than patient:  [ ]Family   [ ]Team     Pain (Impact on QOL):  appears comfortable through FACES scale  Location -         Minimal acceptable level (0-10 scale):                    Aggrevating factors -  Quality -  Radiation -  Severity (0-10 scale) -    Timing -    PAIN AD Score:     [email or web address] (press ctrl +  left click to view)    Dyspnea:       none                    [ ]Mild [ ]Moderate [ ]Severe  Anxiety:         none                    [ ]Mild [ ]Moderate [ ]Severe  Fatigue:                             [ ]Mild [x ]Moderate [ ]Severe  Nausea:           none                  [ ]Mild [ ]Moderate [ ]Severe  Loss of appetite:              [ ]Mild [x ]Moderate [ ]Severe  Constipation:                    [ ]Mild [x ]Moderate [ ]Severe    Other Symptoms:  [ x]All other review of systems negative     Karnofsky Performance Score/Palliative Performance Status Version 2:   30      %  PHYSICAL EXAM:  Vital Signs Last 24 Hrs  T(C): 36.7 (27 Sep 2018 09:54), Max: 37.5 (26 Sep 2018 17:28)  T(F): 98 (27 Sep 2018 09:54), Max: 99.5 (26 Sep 2018 17:28)  HR: 81 (27 Sep 2018 09:54) (72 - 100)  BP: 145/76 (27 Sep 2018 09:54) (112/85 - 166/82)  BP(mean): --  RR: 18 (27 Sep 2018 09:54) (16 - 18)  SpO2: 98% (27 Sep 2018 09:54) (98% - 100%) I&O's Summary  GENERAL:  [x ]Alert  [ ]Oriented x   [ ]Lethargic  [ ]Cachexia  [ ]Unarousable  [ ]Verbal  [x ]Non-Verbal  Behavioral:   [ ] Anxiety  [ ] Delirium [ ] Agitation [ ] Other  HEENT:  [x ]Normal   [ ]Dry mouth   [ ]ET Tube/Trach  [ ]Oral lesions  PULMONARY:   [x ]Clear [ ]Tachypnea  [ ]Audible excessive secretions   [ ]Rhonchi        [ ]Right [ ]Left [ ]Bilateral  [ ]Crackles        [ ]Right [ ]Left [ ]Bilateral  [ ]Wheezing     [ ]Right [ ]Left [ ]Bilateral  CARDIOVASCULAR:    [ x]Regular [ ]Irregular [ ]Tachy  [ ]Jose Luis [ ]Murmur [ ]Other  GASTROINTESTINAL:  [x ]Soft  [ ]Distended   [x ]+BS  [x ]Non tender [ ]Tender  [ ]PEG [ ]OGT/ NGT  Last BM: GENITOURINARY:  [ ]Normal [ ]x Incontinent   [ ]Oliguria/Anuria   [ ]Pereira  MUSCULOSKELETAL:   [ ]Normal   [ ]Weakness  [ x]Bed/Wheelchair bound [ ]Edema  NEUROLOGIC:   [ ]No focal deficits  [x ] Cognitive impairment  [ ] Dysphagia [ ]Dysarthria [ ] Paresis [ ]Other   SKIN:   [ x]Normal   [ ]Pressure ulcer(s)  [ ]Rash    CRITICAL CARE:  [ ] Shock Present  [ ]Septic [ ]Cardiogenic [ ]Neurologic [ ]Hypovolemic  [ ]  Vasopressors [ ]  Inotropes   [ ] Respiratory failure present  [ ] Acute  [ ] Chronic [ ] Hypoxic  [ ] Hypercarbic [ ] Other  [ ] Other organ failure     LABS:                        6.6    4.44  )-----------( 165      ( 27 Sep 2018 06:30 )             21.1       157<H>  |  118<H>  |  15  ----------------------------<  93  3.7   |  28  |  0.76    Ca    9.0      27 Sep 2018 06:55  Phos  3.4       Mg     2.2         TPro  6.7  /  Alb  3.7  /  TBili  1.8<H>  /  DBili  x   /  AST  14  /  ALT  7   /  AlkPhos  89        Urinalysis Basic - ( 26 Sep 2018 19:45 )    Color: YELLOW / Appearance: CLEAR / S.024 / pH: 5.5  Gluc: NEGATIVE / Ketone: NEGATIVE  / Bili: NEGATIVE / Urobili: TRACE   Blood: NEGATIVE / Protein: 20 / Nitrite: NEGATIVE   Leuk Esterase: NEGATIVE / RBC: x / WBC x   Sq Epi: x / Non Sq Epi: x / Bacteria: x      RADIOLOGY & ADDITIONAL STUDIES:    PROTEIN CALORIE MALNUTRITION:   [ ] PPSV2 < or = to 30% [ ] significant weight loss  [ ] poor nutritional intake [ ] catabolic state [ ] anasarca     Albumin, Serum: 3.7 g/dL (18 @ 06:55)  Artificial Nutrition [ ]     REFERRALS:   [ ]Chaplaincy  [ ] Hospice  [ ]Child Life  [ ]Social Work  [ ]Case management [ ]Holistic Therapy   Goals of Care Discussion Document:

## 2018-09-27 NOTE — H&P ADULT - PROBLEM SELECTOR PLAN 2
Likely Likely in setting of decreased PO intake of fluid and salt. Sodium of urine ~550 (more concentrated compared to serum). Less likely diabetes insipidus, however per up to date, may still be concerned given that it is relatively lower than would be expected.   - Plan to correct no more than 6-8 mEq in the first 14 hours given likely chronicity due to her dementia  - Starting D5 at 50 cc/hr. Will need BMP Q4 after (next will be with post transfusion CBC after second unit). Likely in setting of decreased PO intake of fluid and salt. Sodium of urine ~550 (more concentrated compared to serum). Less likely diabetes insipidus, however per up to date, may still be concerned given that it is relatively lower than would be expected. Appears to have some abdominal tenderness on exam, however UA did not appear consistent with UTI.   - Plan to correct no more than 6-8 mEq in the first 14 hours given likely chronicity due to her dementia  - Starting D5 at 50 cc/hr. Will need BMP Q4 after (next will be with post transfusion CBC after second unit).

## 2018-09-27 NOTE — H&P ADULT - PROBLEM SELECTOR PLAN 1
Likely in setting of bleeding from underlying endometrial cancer. Per ED documentation blood transfusions were discussed with family. Appears to be hemodynamically stable (hypertensive, mildly tachycardic) on vital signs. Likely chronic process.   - Continue blood transfusions for goal hemoglobin 7  - Continue to monitor for signs of heart failure. No signs of edema or respiratory difficulty after first unit.   - Post transfusion CBC after second unit.

## 2018-09-28 ENCOUNTER — TRANSCRIPTION ENCOUNTER (OUTPATIENT)
Age: 83
End: 2018-09-28

## 2018-09-28 DIAGNOSIS — Z71.89 OTHER SPECIFIED COUNSELING: ICD-10-CM

## 2018-09-28 LAB
ALBUMIN SERPL ELPH-MCNC: 3.3 G/DL — SIGNIFICANT CHANGE UP (ref 3.3–5)
ALP SERPL-CCNC: 88 U/L — SIGNIFICANT CHANGE UP (ref 40–120)
ALT FLD-CCNC: 6 U/L — SIGNIFICANT CHANGE UP (ref 4–33)
AST SERPL-CCNC: 12 U/L — SIGNIFICANT CHANGE UP (ref 4–32)
BACTERIA UR CULT: SIGNIFICANT CHANGE UP
BASOPHILS # BLD AUTO: 0.02 K/UL — SIGNIFICANT CHANGE UP (ref 0–0.2)
BASOPHILS NFR BLD AUTO: 0.4 % — SIGNIFICANT CHANGE UP (ref 0–2)
BILIRUB SERPL-MCNC: 1.2 MG/DL — SIGNIFICANT CHANGE UP (ref 0.2–1.2)
BUN SERPL-MCNC: 12 MG/DL — SIGNIFICANT CHANGE UP (ref 7–23)
BUN SERPL-MCNC: 13 MG/DL — SIGNIFICANT CHANGE UP (ref 7–23)
BUN SERPL-MCNC: 14 MG/DL — SIGNIFICANT CHANGE UP (ref 7–23)
CALCIUM SERPL-MCNC: 8.6 MG/DL — SIGNIFICANT CHANGE UP (ref 8.4–10.5)
CALCIUM SERPL-MCNC: 8.7 MG/DL — SIGNIFICANT CHANGE UP (ref 8.4–10.5)
CALCIUM SERPL-MCNC: 8.8 MG/DL — SIGNIFICANT CHANGE UP (ref 8.4–10.5)
CALCIUM SERPL-MCNC: 8.9 MG/DL — SIGNIFICANT CHANGE UP (ref 8.4–10.5)
CALCIUM SERPL-MCNC: 9.2 MG/DL — SIGNIFICANT CHANGE UP (ref 8.4–10.5)
CHLORIDE SERPL-SCNC: 116 MMOL/L — HIGH (ref 98–107)
CHLORIDE SERPL-SCNC: 117 MMOL/L — HIGH (ref 98–107)
CHLORIDE SERPL-SCNC: 118 MMOL/L — HIGH (ref 98–107)
CHLORIDE SERPL-SCNC: 119 MMOL/L — HIGH (ref 98–107)
CHLORIDE SERPL-SCNC: 120 MMOL/L — HIGH (ref 98–107)
CO2 SERPL-SCNC: 25 MMOL/L — SIGNIFICANT CHANGE UP (ref 22–31)
CO2 SERPL-SCNC: 26 MMOL/L — SIGNIFICANT CHANGE UP (ref 22–31)
CO2 SERPL-SCNC: 26 MMOL/L — SIGNIFICANT CHANGE UP (ref 22–31)
CO2 SERPL-SCNC: 27 MMOL/L — SIGNIFICANT CHANGE UP (ref 22–31)
CO2 SERPL-SCNC: 27 MMOL/L — SIGNIFICANT CHANGE UP (ref 22–31)
CREAT SERPL-MCNC: 0.69 MG/DL — SIGNIFICANT CHANGE UP (ref 0.5–1.3)
CREAT SERPL-MCNC: 0.69 MG/DL — SIGNIFICANT CHANGE UP (ref 0.5–1.3)
CREAT SERPL-MCNC: 0.73 MG/DL — SIGNIFICANT CHANGE UP (ref 0.5–1.3)
CREAT SERPL-MCNC: 0.74 MG/DL — SIGNIFICANT CHANGE UP (ref 0.5–1.3)
CREAT SERPL-MCNC: 0.82 MG/DL — SIGNIFICANT CHANGE UP (ref 0.5–1.3)
EOSINOPHIL # BLD AUTO: 0.12 K/UL — SIGNIFICANT CHANGE UP (ref 0–0.5)
EOSINOPHIL NFR BLD AUTO: 2.6 % — SIGNIFICANT CHANGE UP (ref 0–6)
GLUCOSE SERPL-MCNC: 84 MG/DL — SIGNIFICANT CHANGE UP (ref 70–99)
GLUCOSE SERPL-MCNC: 85 MG/DL — SIGNIFICANT CHANGE UP (ref 70–99)
GLUCOSE SERPL-MCNC: 87 MG/DL — SIGNIFICANT CHANGE UP (ref 70–99)
GLUCOSE SERPL-MCNC: 90 MG/DL — SIGNIFICANT CHANGE UP (ref 70–99)
GLUCOSE SERPL-MCNC: 93 MG/DL — SIGNIFICANT CHANGE UP (ref 70–99)
HCT VFR BLD CALC: 23.1 % — LOW (ref 34.5–45)
HGB BLD-MCNC: 7.2 G/DL — LOW (ref 11.5–15.5)
IMM GRANULOCYTES # BLD AUTO: 0.04 # — SIGNIFICANT CHANGE UP
IMM GRANULOCYTES NFR BLD AUTO: 0.9 % — SIGNIFICANT CHANGE UP (ref 0–1.5)
LYMPHOCYTES # BLD AUTO: 0.7 K/UL — LOW (ref 1–3.3)
LYMPHOCYTES # BLD AUTO: 14.9 % — SIGNIFICANT CHANGE UP (ref 13–44)
MCHC RBC-ENTMCNC: 26.3 PG — LOW (ref 27–34)
MCHC RBC-ENTMCNC: 31.2 % — LOW (ref 32–36)
MCV RBC AUTO: 84.3 FL — SIGNIFICANT CHANGE UP (ref 80–100)
MONOCYTES # BLD AUTO: 0.34 K/UL — SIGNIFICANT CHANGE UP (ref 0–0.9)
MONOCYTES NFR BLD AUTO: 7.2 % — SIGNIFICANT CHANGE UP (ref 2–14)
NEUTROPHILS # BLD AUTO: 3.48 K/UL — SIGNIFICANT CHANGE UP (ref 1.8–7.4)
NEUTROPHILS NFR BLD AUTO: 74 % — SIGNIFICANT CHANGE UP (ref 43–77)
NRBC # FLD: 0.06 — SIGNIFICANT CHANGE UP
NRBC FLD-RTO: 1.3 — SIGNIFICANT CHANGE UP
PLATELET # BLD AUTO: 147 K/UL — LOW (ref 150–400)
PMV BLD: 12.6 FL — SIGNIFICANT CHANGE UP (ref 7–13)
POTASSIUM SERPL-MCNC: 3.6 MMOL/L — SIGNIFICANT CHANGE UP (ref 3.5–5.3)
POTASSIUM SERPL-MCNC: 3.6 MMOL/L — SIGNIFICANT CHANGE UP (ref 3.5–5.3)
POTASSIUM SERPL-MCNC: 3.7 MMOL/L — SIGNIFICANT CHANGE UP (ref 3.5–5.3)
POTASSIUM SERPL-MCNC: 3.9 MMOL/L — SIGNIFICANT CHANGE UP (ref 3.5–5.3)
POTASSIUM SERPL-MCNC: 4.5 MMOL/L — SIGNIFICANT CHANGE UP (ref 3.5–5.3)
POTASSIUM SERPL-SCNC: 3.6 MMOL/L — SIGNIFICANT CHANGE UP (ref 3.5–5.3)
POTASSIUM SERPL-SCNC: 3.6 MMOL/L — SIGNIFICANT CHANGE UP (ref 3.5–5.3)
POTASSIUM SERPL-SCNC: 3.7 MMOL/L — SIGNIFICANT CHANGE UP (ref 3.5–5.3)
POTASSIUM SERPL-SCNC: 3.9 MMOL/L — SIGNIFICANT CHANGE UP (ref 3.5–5.3)
POTASSIUM SERPL-SCNC: 4.5 MMOL/L — SIGNIFICANT CHANGE UP (ref 3.5–5.3)
PROT SERPL-MCNC: 6.1 G/DL — SIGNIFICANT CHANGE UP (ref 6–8.3)
RBC # BLD: 2.74 M/UL — LOW (ref 3.8–5.2)
RBC # FLD: 15.9 % — HIGH (ref 10.3–14.5)
SODIUM SERPL-SCNC: 152 MMOL/L — HIGH (ref 135–145)
SODIUM SERPL-SCNC: 153 MMOL/L — HIGH (ref 135–145)
SODIUM SERPL-SCNC: 154 MMOL/L — HIGH (ref 135–145)
SODIUM SERPL-SCNC: 154 MMOL/L — HIGH (ref 135–145)
SODIUM SERPL-SCNC: 157 MMOL/L — HIGH (ref 135–145)
SPECIMEN SOURCE: SIGNIFICANT CHANGE UP
WBC # BLD: 4.7 K/UL — SIGNIFICANT CHANGE UP (ref 3.8–10.5)
WBC # FLD AUTO: 4.7 K/UL — SIGNIFICANT CHANGE UP (ref 3.8–10.5)

## 2018-09-28 RX ORDER — ACETAMINOPHEN WITH CODEINE 300MG-30MG
15 TABLET ORAL
Qty: 0 | Refills: 0 | COMMUNITY

## 2018-09-28 RX ORDER — ACETAMINOPHEN WITH CODEINE 300MG-30MG
15 TABLET ORAL EVERY 8 HOURS
Qty: 0 | Refills: 0 | Status: DISCONTINUED | OUTPATIENT
Start: 2018-09-28 | End: 2018-10-01

## 2018-09-28 RX ORDER — ESCITALOPRAM OXALATE 10 MG/1
10 TABLET, FILM COATED ORAL DAILY
Qty: 0 | Refills: 0 | Status: DISCONTINUED | OUTPATIENT
Start: 2018-09-28 | End: 2018-10-01

## 2018-09-28 RX ORDER — ESCITALOPRAM OXALATE 10 MG/1
10 TABLET, FILM COATED ORAL
Qty: 0 | Refills: 0 | COMMUNITY

## 2018-09-28 RX ORDER — SODIUM CHLORIDE 9 MG/ML
1000 INJECTION, SOLUTION INTRAVENOUS
Qty: 0 | Refills: 0 | Status: DISCONTINUED | OUTPATIENT
Start: 2018-09-28 | End: 2018-09-29

## 2018-09-28 RX ORDER — SODIUM CHLORIDE 9 MG/ML
1000 INJECTION, SOLUTION INTRAVENOUS
Qty: 0 | Refills: 0 | Status: DISCONTINUED | OUTPATIENT
Start: 2018-09-28 | End: 2018-09-28

## 2018-09-28 RX ORDER — POLYETHYLENE GLYCOL 3350 17 G/17G
17 POWDER, FOR SOLUTION ORAL AT BEDTIME
Qty: 0 | Refills: 0 | Status: DISCONTINUED | OUTPATIENT
Start: 2018-09-28 | End: 2018-10-01

## 2018-09-28 RX ADMIN — SODIUM CHLORIDE 50 MILLILITER(S): 9 INJECTION, SOLUTION INTRAVENOUS at 00:45

## 2018-09-28 NOTE — DISCHARGE NOTE ADULT - MEDICATION SUMMARY - MEDICATIONS TO STOP TAKING
I will STOP taking the medications listed below when I get home from the hospital:    amLODIPine 5 mg oral tablet  -- 1 tab(s) by mouth once a day    docusate sodium 100 mg oral capsule  -- 1 cap(s) by mouth 2 times a day, As Needed

## 2018-09-28 NOTE — PROGRESS NOTE ADULT - SUBJECTIVE AND OBJECTIVE BOX
Patient is a 83y old  Female who presents with a chief complaint of Anemia (27 Sep 2018 14:36)      SUBJECTIVE / OVERNIGHT EVENTS:  Appears comfortable.  Review of Systems:   CONSTITUTIONAL: No fever, weight loss, or fatigue  EYES: No eye pain, visual disturbances, or discharge  ENMT:  No difficulty hearing, tinnitus, vertigo; No sinus or throat pain  NECK: No pain or stiffness  BREASTS: No pain, masses, or nipple discharge  RESPIRATORY: No cough, wheezing, chills or hemoptysis; No shortness of breath  CARDIOVASCULAR: No chest pain, palpitations, dizziness, or leg swelling  GASTROINTESTINAL: No abdominal or epigastric pain. No nausea, vomiting, or hematemesis; No diarrhea or constipation. No melena or hematochezia.  GENITOURINARY: No dysuria, frequency, hematuria, or incontinence  NEUROLOGICAL: No headaches, memory loss, loss of strength, numbness, or tremors  SKIN: No itching, burning, rashes, or lesions   LYMPH NODES: No enlarged glands  ENDOCRINE: No heat or cold intolerance; No hair loss  MUSCULOSKELETAL: No joint pain or swelling; No muscle, back, or extremity pain  PSYCHIATRIC: No depression, anxiety, mood swings, or difficulty sleeping  HEME/LYMPH: No easy bruising, or bleeding gums  ALLERY AND IMMUNOLOGIC: No hives or eczema    MEDICATIONS  (STANDING):  dextrose 5%. 1000 milliLiter(s) (50 mL/Hr) IV Continuous <Continuous>    MEDICATIONS  (PRN):      PHYSICAL EXAM:  Vital Signs Last 24 Hrs  T(C): 36.4 (28 Sep 2018 05:15), Max: 36.8 (27 Sep 2018 14:46)  T(F): 97.6 (28 Sep 2018 05:15), Max: 98.3 (27 Sep 2018 14:46)  HR: 73 (28 Sep 2018 05:15) (55 - 73)  BP: 148/103 (28 Sep 2018 05:15) (119/96 - 152/69)  BP(mean): --  RR: 18 (28 Sep 2018 05:15) (18 - 18)  SpO2: 95% (28 Sep 2018 05:15) (95% - 95%)  I&O's Summary    GENERAL: NAD, well-developed  HEAD:  Atraumatic, Normocephalic  EYES: EOMI, PERRLA, conjunctiva and sclera clear  NECK: Supple, No JVD  CHEST/LUNG: Clear to auscultation bilaterally; No wheeze  HEART: Regular rate and rhythm; No murmurs, rubs, or gallops  ABDOMEN: Soft, Nontender, Nondistended; Bowel sounds present  EXTREMITIES:  2+ Peripheral Pulses, No clubbing, cyanosis, or edema  PSYCH: AAOx3  NEUROLOGY: non-focal  SKIN: No rashes or lesions    LABS:  CAPILLARY BLOOD GLUCOSE                              7.2    4.70  )-----------( 147      ( 28 Sep 2018 05:00 )             23.1         152<H>  |  118<H>  |  13  ----------------------------<  85  3.9   |  26  |  0.69    Ca    8.9      28 Sep 2018 09:50  Phos  3.4       Mg     2.2         TPro  6.1  /  Alb  3.3  /  TBili  1.2  /  DBili  x   /  AST  12  /  ALT  6   /  AlkPhos  88            Urinalysis Basic - ( 26 Sep 2018 19:45 )    Color: YELLOW / Appearance: CLEAR / S.024 / pH: 5.5  Gluc: NEGATIVE / Ketone: NEGATIVE  / Bili: NEGATIVE / Urobili: TRACE   Blood: NEGATIVE / Protein: 20 / Nitrite: NEGATIVE   Leuk Esterase: NEGATIVE / RBC: x / WBC x   Sq Epi: x / Non Sq Epi: x / Bacteria: x        RADIOLOGY & ADDITIONAL TESTS:    Imaging Personally Reviewed:    Consultant(s) Notes Reviewed:      Care Discussed with Consultants/Other Providers:

## 2018-09-28 NOTE — DISCHARGE NOTE ADULT - PATIENT PORTAL LINK FT
You can access the [facility] Patient Portal, offered by [facility], by registering with the following website: [email or web address]

## 2018-09-28 NOTE — DISCHARGE NOTE ADULT - MEDICATION SUMMARY - MEDICATIONS TO TAKE
I will START or STAY ON the medications listed below when I get home from the hospital:    Tylenol with Codeine 120 mg-12 mg/5 mL oral liquid  -- 15 milliliter(s) by mouth every 8 hours, As Needed  -- Indication: For Pain    Lexapro 5 mg/5 mL oral solution  -- 10 milliliter(s) by mouth once a day  -- Indication: For Alzheimers disease / Depression    polyethylene glycol 3350 oral powder for reconstitution  -- 17 gram(s) by mouth once a day (at bedtime), As Needed  -- Indication: For Consitpation

## 2018-09-28 NOTE — PROGRESS NOTE ADULT - PROBLEM SELECTOR PLAN 2
Likely in setting of decreased PO intake of fluid and salt. Sodium of urine ~550 (more concentrated compared to serum). Less likely diabetes insipidus, however per up to date, may still be concerned given that it is relatively lower than would be expected. Appears to have some abdominal tenderness on exam, however UA did not appear consistent with UTI.   - Plan to correct no more than 6-8 mEq in the first 14 hours given likely chronicity due to her dementia  - Starting D5 at 50 cc/hr. Will need BMP Q4 after (next will be with post transfusion CBC after second unit).

## 2018-09-28 NOTE — DISCHARGE NOTE ADULT - HOSPITAL COURSE
82 yo F PMHx Alzheimer's dementia (AxO1 baseline), non-operable endometrial cancer w/ intermittent vaginal bleeding, CHF, HTN, atrial fibrillation, at usual state of health 4 days ago but has subsequently increased somnolence and with decreased PO intake in setting of worsening anemia and hypernatremia.     Anemia requiring transfusions.  Likely in setting of bleeding from underlying endometrial cancer  . Per ED documentation blood transfusions were discussed with family. Appears to be hemodynamically stable (hypertensive, mildly tachycardic) on vital signs. Likely chronic process.   Post Tx CBC - H & H 7.2     Hypernatremia.  Likely in setting of decreased PO intake of fluid and salt.   - Plan to correct no more than 6-8 mEq in the first 14 hours given likely chronicity due to her dementia  - Starting D5 at 50 cc/hr.    Altered mental status.    Likely in setting of metabolic derangements, advanced dementia and anemia  - Continue to gently correct sodium and anemia      Endometrial cancer.  Per documentation has advanced cancer unable to be resected.   Palliative c/s- Comfort care \Hospice referral done 82 yo F PMHx Alzheimer's dementia (AxO1 baseline), non-operable endometrial cancer w/ intermittent vaginal bleeding, CHF, HTN, atrial fibrillation, at usual state of health 4 days ago but has subsequently increased somnolence and with decreased PO intake in setting of worsening anemia and hypernatremia.    Hospital course:     Anemia requiring transfusions.  -Likely in setting of bleeding from underlying endometrial cancer  -S/p blood transfusions   -Hgb/hct stable prior to discharge        Hypernatremia.  - Likely in setting of decreased PO intake of fluid and salt.   - Correcting with D5 (no more than 6-8 mEq in the first 14 hours given likely chronicity due to her dementia)  - Resolved prior to discharge    Altered mental status.  - Likely in setting of metabolic derangements, advanced dementia and anemia  - Hypernatremia resolved  - S/p PRBC for anemia  - At baseline      Endometrial cancer.  - Per documentation has advanced cancer unable to be resected.   - Palliative care team consulted  - Goal is home hospice with focus on comfort care    Dispo: Home hospice

## 2018-09-28 NOTE — PROGRESS NOTE ADULT - PROBLEM SELECTOR PLAN 1
Likely in setting of bleeding from underlying endometrial cancer. Per ED documentation blood transfusions were discussed with family. Appears to be hemodynamically stable (hypertensive, mildly tachycardic) on vital signs. Likely chronic process.   - Continue to monitor for signs of heart failure. No signs of edema or respiratory difficulty after first unit.   - Post transfusion CBC after second unit.

## 2018-09-28 NOTE — PROGRESS NOTE ADULT - PROBLEM SELECTOR PLAN 5
AxOx1 at baseline reported on documentation. Likely component of poor PO intake from advanced dementia.

## 2018-09-28 NOTE — DISCHARGE NOTE ADULT - PRINCIPAL DIAGNOSIS
Additional Notes: Patient to use Rogaine 5% BID, and take oral Biotin QD Anemia requiring transfusions

## 2018-09-28 NOTE — DISCHARGE NOTE ADULT - CARE PLAN
Principal Discharge DX:	Anemia requiring transfusions  Goal:	To be hemodynamically stable  Assessment and plan of treatment:	Follow up with [facility] and [facility] - Goal is comfort care  Secondary Diagnosis:	Hypernatremia  Assessment and plan of treatment:	Resolving with IVF  Secondary Diagnosis:	Alzheimers disease  Secondary Diagnosis:	Endometrial cancer  Secondary Diagnosis:	Palliative care encounter Principal Discharge DX:	Anemia requiring transfusions  Goal:	To be hemodynamically stable  Assessment and plan of treatment:	Follow up with [facility] call doctors and Hosp[facility] care network - Goal is comfort care  Secondary Diagnosis:	Hypernatremia  Assessment and plan of treatment:	Resolved s/p IVF.  Secondary Diagnosis:	Alzheimers disease  Assessment and plan of treatment:	Home hospice, Continue with supportive care. Goal is comfort care.  Secondary Diagnosis:	Endometrial cancer  Assessment and plan of treatment:	Home hospice, continue with supportive care. Goal is comfort care.  Secondary Diagnosis:	Palliative care encounter  Assessment and plan of treatment:	Home hospice, continue with supportive care. Goal is comfort care.

## 2018-09-28 NOTE — CHART NOTE - NSCHARTNOTEFT_GEN_A_CORE
Called by RN for Na+ 157. No neurological changes noted.       Vital Signs Last 24 Hrs  T(C): 36.7 (27 Sep 2018 18:14), Max: 37.2 (27 Sep 2018 04:08)  T(F): 98.1 (27 Sep 2018 18:14), Max: 98.9 (27 Sep 2018 04:08)  HR: 62 (27 Sep 2018 18:14) (55 - 81)  BP: 152/69 (27 Sep 2018 18:14) (119/96 - 158/73)  BP(mean): --  RR: 18 (27 Sep 2018 18:14) (17 - 18)  SpO2: 95% (27 Sep 2018 14:46) (95% - 100%)      - Plan to correct no more than 6-8 mEq in the first 14 hours   - Starting D5 at 50 cc/hr. Continue BMP Q4

## 2018-09-28 NOTE — DISCHARGE NOTE ADULT - PLAN OF CARE
To be hemodynamically stable Follow up with [facility] call doctors and [facility]osp[facility] care network - Goal is comfort care Resolving with IVF Resolved s/p IVF. Home hospice, Continue with supportive care. Goal is comfort care. Home hospice, continue with supportive care. Goal is comfort care.

## 2018-09-29 LAB
BUN SERPL-MCNC: 12 MG/DL — SIGNIFICANT CHANGE UP (ref 7–23)
CALCIUM SERPL-MCNC: 9.1 MG/DL — SIGNIFICANT CHANGE UP (ref 8.4–10.5)
CHLORIDE SERPL-SCNC: 114 MMOL/L — HIGH (ref 98–107)
CO2 SERPL-SCNC: 27 MMOL/L — SIGNIFICANT CHANGE UP (ref 22–31)
CREAT SERPL-MCNC: 0.73 MG/DL — SIGNIFICANT CHANGE UP (ref 0.5–1.3)
GLUCOSE SERPL-MCNC: 88 MG/DL — SIGNIFICANT CHANGE UP (ref 70–99)
HCT VFR BLD CALC: 24 % — LOW (ref 34.5–45)
HGB BLD-MCNC: 7.6 G/DL — LOW (ref 11.5–15.5)
MCHC RBC-ENTMCNC: 26 PG — LOW (ref 27–34)
MCHC RBC-ENTMCNC: 31.7 % — LOW (ref 32–36)
MCV RBC AUTO: 82.2 FL — SIGNIFICANT CHANGE UP (ref 80–100)
NRBC # FLD: 0.09 — SIGNIFICANT CHANGE UP
NRBC FLD-RTO: 1.8 — SIGNIFICANT CHANGE UP
PLATELET # BLD AUTO: 140 K/UL — LOW (ref 150–400)
PMV BLD: 12.4 FL — SIGNIFICANT CHANGE UP (ref 7–13)
POTASSIUM SERPL-MCNC: 3.6 MMOL/L — SIGNIFICANT CHANGE UP (ref 3.5–5.3)
POTASSIUM SERPL-SCNC: 3.6 MMOL/L — SIGNIFICANT CHANGE UP (ref 3.5–5.3)
RBC # BLD: 2.92 M/UL — LOW (ref 3.8–5.2)
RBC # FLD: 16.6 % — HIGH (ref 10.3–14.5)
SODIUM SERPL-SCNC: 151 MMOL/L — HIGH (ref 135–145)
WBC # BLD: 5.13 K/UL — SIGNIFICANT CHANGE UP (ref 3.8–10.5)
WBC # FLD AUTO: 5.13 K/UL — SIGNIFICANT CHANGE UP (ref 3.8–10.5)

## 2018-09-29 RX ORDER — SODIUM CHLORIDE 9 MG/ML
1000 INJECTION, SOLUTION INTRAVENOUS
Qty: 0 | Refills: 0 | Status: DISCONTINUED | OUTPATIENT
Start: 2018-09-29 | End: 2018-10-01

## 2018-09-29 RX ADMIN — ESCITALOPRAM OXALATE 10 MILLIGRAM(S): 10 TABLET, FILM COATED ORAL at 11:29

## 2018-09-29 RX ADMIN — SODIUM CHLORIDE 50 MILLILITER(S): 9 INJECTION, SOLUTION INTRAVENOUS at 09:37

## 2018-09-29 NOTE — PROGRESS NOTE ADULT - ASSESSMENT
· Assessment	  84 yo F PMHx Alzheimer's dementia (AxO1 baseline), non-operable endometrial cancer w/ intermittent vaginal bleeding, CHF, HTN, atrial fibrillation, at usual state of health 4 days ago but has subsequently increased somnolence and with decreased PO intake in setting of worsening anemia and hypernatremia.     Problem/Plan - 1:  ·  Problem: Anemia requiring transfusions.  Plan: PRBC   Hb 7.6     Problem/Plan - 2:  ·  Problem: Hypernatremia.  Plan: BMP       Problem/Plan - 4:  ·  Problem: Endometrial cancer.  Plan: Per documentation has advanced cancer unable to be resected.   - Continue discussions with family regarding goals of care       Problem/Plan - 5:  ·  Problem: Alzheimers disease.  Plan: AxOx1 at baseline reported on documentation. Likely component of poor PO intake from advanced dementia.

## 2018-09-29 NOTE — PROGRESS NOTE ADULT - SUBJECTIVE AND OBJECTIVE BOX
Patient is a 83y old  Female who presents with a chief complaint of anemia (28 Sep 2018 20:32)      SUBJECTIVE / OVERNIGHT EVENTS:   Feels better.  Denies CP/SOB/Palpitation/HA.    MEDICATIONS  (STANDING):  dextrose 5%. 1000 milliLiter(s) (50 mL/Hr) IV Continuous <Continuous>  escitalopram Solution 10 milliGRAM(s) Oral daily    MEDICATIONS  (PRN):  acetaminophen with codeine Elixir 15 milliLiter(s) Oral every 8 hours PRN Severe Pain (7 - 10)  polyethylene glycol 3350 17 Gram(s) Oral at bedtime PRN Constipation        CAPILLARY BLOOD GLUCOSE        I&O's Summary      PHYSICAL EXAM:    NECK: Supple, No JVD  CHEST/LUNG: Clear to auscultation bilaterally; No wheezing.  HEART: Regular rate and rhythm; No murmurs, rubs, or gallops  ABDOMEN: Soft, Nontender, Nondistended; Bowel sounds present  EXTREMITIES:   No clubbing, cyanosis, or edema  NEUROLOGY: Awake  SKIN: No rashes    LABS:                        7.6    5.13  )-----------( 140      ( 29 Sep 2018 07:20 )             24.0     09-29    151<H>  |  114<H>  |  12  ----------------------------<  88  3.6   |  27  |  0.73    Ca    9.1      29 Sep 2018 07:20    TPro  6.1  /  Alb  3.3  /  TBili  1.2  /  DBili  x   /  AST  12  /  ALT  6   /  AlkPhos  88  09-28            CAPILLARY BLOOD GLUCOSE        09-26 @ 20:21  Culture-urine   NO GROWTH AT 24 HOURS  Culture results --  method type --  Organism --  Organism Identification --  Specimen source URINE MIDSTREAM           09-26 @ 20:21  Culture blood --  Culture results --  Gram stain --  Gram stain blood --  Method type --  Organism --  Organism identification --  Specimen source URINE MIDSTREAM      RADIOLOGY & ADDITIONAL TESTS:    Imaging Personally Reviewed:    Consultant(s) Notes Reviewed:      Care Discussed with Consultants/Other Providers:

## 2018-09-30 LAB
BUN SERPL-MCNC: 11 MG/DL — SIGNIFICANT CHANGE UP (ref 7–23)
CALCIUM SERPL-MCNC: 8.9 MG/DL — SIGNIFICANT CHANGE UP (ref 8.4–10.5)
CHLORIDE SERPL-SCNC: 109 MMOL/L — HIGH (ref 98–107)
CO2 SERPL-SCNC: 25 MMOL/L — SIGNIFICANT CHANGE UP (ref 22–31)
CREAT SERPL-MCNC: 0.74 MG/DL — SIGNIFICANT CHANGE UP (ref 0.5–1.3)
GLUCOSE SERPL-MCNC: 91 MG/DL — SIGNIFICANT CHANGE UP (ref 70–99)
HCT VFR BLD CALC: 24.1 % — LOW (ref 34.5–45)
HGB BLD-MCNC: 7.5 G/DL — LOW (ref 11.5–15.5)
MCHC RBC-ENTMCNC: 26.7 PG — LOW (ref 27–34)
MCHC RBC-ENTMCNC: 31.1 % — LOW (ref 32–36)
MCV RBC AUTO: 85.8 FL — SIGNIFICANT CHANGE UP (ref 80–100)
NRBC # FLD: 0.06 — SIGNIFICANT CHANGE UP
NRBC FLD-RTO: 1.4 — SIGNIFICANT CHANGE UP
PLATELET # BLD AUTO: 129 K/UL — LOW (ref 150–400)
PMV BLD: 12.8 FL — SIGNIFICANT CHANGE UP (ref 7–13)
POTASSIUM SERPL-MCNC: 3.3 MMOL/L — LOW (ref 3.5–5.3)
POTASSIUM SERPL-SCNC: 3.3 MMOL/L — LOW (ref 3.5–5.3)
RBC # BLD: 2.81 M/UL — LOW (ref 3.8–5.2)
RBC # FLD: 17.4 % — HIGH (ref 10.3–14.5)
SODIUM SERPL-SCNC: 144 MMOL/L — SIGNIFICANT CHANGE UP (ref 135–145)
WBC # BLD: 4.19 K/UL — SIGNIFICANT CHANGE UP (ref 3.8–10.5)
WBC # FLD AUTO: 4.19 K/UL — SIGNIFICANT CHANGE UP (ref 3.8–10.5)

## 2018-09-30 RX ORDER — POTASSIUM CHLORIDE 20 MEQ
40 PACKET (EA) ORAL ONCE
Qty: 0 | Refills: 0 | Status: COMPLETED | OUTPATIENT
Start: 2018-09-30 | End: 2018-09-30

## 2018-09-30 RX ADMIN — ESCITALOPRAM OXALATE 10 MILLIGRAM(S): 10 TABLET, FILM COATED ORAL at 11:20

## 2018-09-30 RX ADMIN — SODIUM CHLORIDE 50 MILLILITER(S): 9 INJECTION, SOLUTION INTRAVENOUS at 11:21

## 2018-09-30 RX ADMIN — Medication 40 MILLIEQUIVALENT(S): at 11:21

## 2018-09-30 NOTE — PROGRESS NOTE ADULT - SUBJECTIVE AND OBJECTIVE BOX
Patient is a 83y old  Female who presents with a chief complaint of Anemia (29 Sep 2018 19:32)      SUBJECTIVE / OVERNIGHT EVENTS:   Feels better.  Denies CP/SOB/Palpitation/HA.    MEDICATIONS  (STANDING):  dextrose 5%. 1000 milliLiter(s) (50 mL/Hr) IV Continuous <Continuous>  escitalopram Solution 10 milliGRAM(s) Oral daily  potassium chloride   Powder 40 milliEquivalent(s) Oral once    MEDICATIONS  (PRN):  acetaminophen with codeine Elixir 15 milliLiter(s) Oral every 8 hours PRN Severe Pain (7 - 10)  polyethylene glycol 3350 17 Gram(s) Oral at bedtime PRN Constipation        CAPILLARY BLOOD GLUCOSE        I&O's Summary      PHYSICAL EXAM:  GENERAL: NAD, well-developed  HEAD:  Atraumatic, Normocephalic  NECK: Supple, No JVD  CHEST/LUNG: Clear to auscultation bilaterally; No wheezing.  HEART: Regular rate and rhythm; No murmurs, rubs, or gallops  ABDOMEN: Soft, Nontender, Nondistended; Bowel sounds present  EXTREMITIES:   No clubbing, cyanosis, or edema  NEUROLOGY: AAO X 3  SKIN: No rashes    LABS:                        7.5    4.19  )-----------( 129      ( 30 Sep 2018 07:35 )             24.1     09-30    144  |  109<H>  |  11  ----------------------------<  91  3.3<L>   |  25  |  0.74    Ca    8.9      30 Sep 2018 07:35              CAPILLARY BLOOD GLUCOSE        09-26 @ 20:21  Culture-urine   NO GROWTH AT 24 HOURS  Culture results --  method type --  Organism --  Organism Identification --  Specimen source URINE MIDSTREAM           09-26 @ 20:21  Culture blood --  Culture results --  Gram stain --  Gram stain blood --  Method type --  Organism --  Organism identification --  Specimen source URINE MIDSTREAM      RADIOLOGY & ADDITIONAL TESTS:    Imaging Personally Reviewed:    Consultant(s) Notes Reviewed:      Care Discussed with Consultants/Other Providers:

## 2018-10-01 VITALS
DIASTOLIC BLOOD PRESSURE: 63 MMHG | SYSTOLIC BLOOD PRESSURE: 132 MMHG | TEMPERATURE: 98 F | HEART RATE: 65 BPM | RESPIRATION RATE: 15 BRPM | OXYGEN SATURATION: 100 %

## 2018-10-01 LAB
BUN SERPL-MCNC: 9 MG/DL — SIGNIFICANT CHANGE UP (ref 7–23)
CALCIUM SERPL-MCNC: 8.5 MG/DL — SIGNIFICANT CHANGE UP (ref 8.4–10.5)
CHLORIDE SERPL-SCNC: 107 MMOL/L — SIGNIFICANT CHANGE UP (ref 98–107)
CO2 SERPL-SCNC: 26 MMOL/L — SIGNIFICANT CHANGE UP (ref 22–31)
CREAT SERPL-MCNC: 0.66 MG/DL — SIGNIFICANT CHANGE UP (ref 0.5–1.3)
GLUCOSE SERPL-MCNC: 94 MG/DL — SIGNIFICANT CHANGE UP (ref 70–99)
HCT VFR BLD CALC: 28.4 % — LOW (ref 34.5–45)
HGB BLD-MCNC: 8.7 G/DL — LOW (ref 11.5–15.5)
MCHC RBC-ENTMCNC: 26.8 PG — LOW (ref 27–34)
MCHC RBC-ENTMCNC: 30.6 % — LOW (ref 32–36)
MCV RBC AUTO: 87.4 FL — SIGNIFICANT CHANGE UP (ref 80–100)
NRBC # FLD: 0.04 — SIGNIFICANT CHANGE UP
PLATELET # BLD AUTO: 133 K/UL — LOW (ref 150–400)
PMV BLD: 12.5 FL — SIGNIFICANT CHANGE UP (ref 7–13)
POTASSIUM SERPL-MCNC: 3.8 MMOL/L — SIGNIFICANT CHANGE UP (ref 3.5–5.3)
POTASSIUM SERPL-SCNC: 3.8 MMOL/L — SIGNIFICANT CHANGE UP (ref 3.5–5.3)
RBC # BLD: 3.25 M/UL — LOW (ref 3.8–5.2)
RBC # FLD: 19.5 % — HIGH (ref 10.3–14.5)
SODIUM SERPL-SCNC: 144 MMOL/L — SIGNIFICANT CHANGE UP (ref 135–145)
WBC # BLD: 5.09 K/UL — SIGNIFICANT CHANGE UP (ref 3.8–10.5)
WBC # FLD AUTO: 5.09 K/UL — SIGNIFICANT CHANGE UP (ref 3.8–10.5)

## 2018-10-01 RX ORDER — INFLUENZA VIRUS VACCINE 15; 15; 15; 15 UG/.5ML; UG/.5ML; UG/.5ML; UG/.5ML
0.5 SUSPENSION INTRAMUSCULAR ONCE
Qty: 0 | Refills: 0 | Status: COMPLETED | OUTPATIENT
Start: 2018-10-01 | End: 2018-10-01

## 2018-10-01 RX ADMIN — ESCITALOPRAM OXALATE 10 MILLIGRAM(S): 10 TABLET, FILM COATED ORAL at 12:13

## 2018-10-01 RX ADMIN — INFLUENZA VIRUS VACCINE 0.5 MILLILITER(S): 15; 15; 15; 15 SUSPENSION INTRAMUSCULAR at 13:34

## 2018-10-01 RX ADMIN — SODIUM CHLORIDE 50 MILLILITER(S): 9 INJECTION, SOLUTION INTRAVENOUS at 04:33

## 2018-10-01 RX ADMIN — SODIUM CHLORIDE 50 MILLILITER(S): 9 INJECTION, SOLUTION INTRAVENOUS at 12:16

## 2018-10-01 NOTE — CHART NOTE - NSCHARTNOTEFT_GEN_A_CORE
Pt to go home with hospice.  Goals and symptoms established.  Will sign off.  Please call back if condition changes.  Cassy Oliveira DO

## 2018-10-01 NOTE — GOALS OF CARE CONVERSATION - PERSONAL ADVANCE DIRECTIVE - CONVERSATION DETAILS
[facility] - Hospice is ready to deliver care, equipment is in the home.
[facility] - Consents signed, Hosp bed, ALBA and walker will be sent from [facility] 9/29 AM.  states that [facility] aides cannot be placed until Mond. Hospice is ready to deliver care.
Had a lengthy discussion with Son Carlos Gallagher who is patients health care surrogate - Explained the prognosis of patient and disease process with FTT- Pts Sodium improving slowly - Na 153   Discussed with son goals of care and son and his wife expressed pt will not want any aggressive measures including CPR/ Intubation/ Feeding tube-   Hospice services introduced- Son agrees with comfort care and comfort food and less aggressive treatment  DNR/ DNI signed by son - Will await Hospice services to deliver the hospital bed .  Spoke with CM to reinstate the HHA which can be done at the earliest for Tuesday

## 2019-04-16 NOTE — ED ADULT NURSE NOTE - BP NONINVASIVE SYSTOLIC (MM HG)
145 Bi-Rhombic Flap Text: The defect edges were debeveled with a #15 scalpel blade.  Given the location of the defect and the proximity to free margins a bi-rhombic flap was deemed most appropriate.  Using a sterile surgical marker, an appropriate rhombic flap was drawn incorporating the defect. The area thus outlined was incised deep to adipose tissue with a #15 scalpel blade.  The skin margins were undermined to an appropriate distance in all directions utilizing iris scissors.

## 2020-09-15 NOTE — ED PROVIDER NOTE - DISCUSSED CLINICAL AND RADIOLOGICAL FINDINGS WITH, MDM
Your Child's Health  12-Month-Old Visit      Mariam SILVER Jarett  September 15, 2020    Visit Vitals  Ht 28.58\" (72.6 cm)   Wt 8.025 kg   HC 43.5 cm (17.13\")   BMI 15.23 kg/m²           Weight: 17.69 lbs    YOUR CHILD'S 12 MONTH OLD VISIT       Key points at this age…  • Correct use of a car seat is always critically important for your baby’s safety. When you replace the infant car seat, make sure to get a convertible car seat that can be kept rear facing until your child reaches the top height or weight limit allowed by the car seat’s .        • Get your child’s dental health off to a good start by starting regular brushing habits (with regular fluoridated toothpaste--not baby toothpaste) and discontinuing the bottle as soon as possible.    • Consistency is critical right now--consistent bedtime routines will help your baby sleep well and consistent responses to how they are behaving will help them learn what is “right” and what is “wrong”.     NUTRITION:   As your baby’s growth slows down after their first birthday, their appetite may vary from day to day--this is normal. It is also common for them to prefer a few particular foods for days (or weeks!) and to sometimes eat one big meal a day and not really care about eating for the rest of the day. You should simply offer healthy foods in small portions. After eating that, if your child still seems hungry, give them another small portion. Continue to be careful to avoid hard, chunky or chewy foods that your child could choke on. And remember that their likes and dislikes will change rapidly, so don’t be afraid to try again with something that ended up on the floor a couple weeks ago. Keep avoiding sweets, junk food and sweet drinks (fruit drinks, soda)--there’s no reason to start those unhealthy habits this early in life!    A switch from formula to cow’s milk is recommended at age 12 months (if your baby is not sensitive to dairy products).  Whole  milk is often recommended at the time of the switch because of its higher fat content. However, if your child is getting a healthy variety of table foods or if your child is at risk for becoming overweight (a family history of obesity, high blood pressure or heart disease), 2% milk (low fat) is okay. Your child should have between 16 and 24 ounces of milk per day.    If your child is still nursing, you can start supplementing with beverages from a cup to ease the transition to independent drinking.    Eating fruit is much healthier than drinking juice. Even \"natural\" juices have extra sugars that can lead to tooth decay and weight gain. Children between 1 and 3 years of age should have no more than 4 ounces of 100% fruit juice daily. Do not water it down in a bottle or sippy cup that they can carry around and drink from over an extended period of time; this frequent exposure to the sugars in juice (even if diluted with water) just increases their risk of tooth decay.      Most toddlers who eat a varied diet will be getting adequate vitamins-- with the exception of vitamin D. For that reason, a straight vitamin D supplement (with either 400 or 600 IU per dose) or a multivitamin preparation with iron is reasonable. (Liquid preparations are available for infants. They should not be given chewable vitamins because they could choke on those.)      DEVELOPMENT/BEHAVIOR:    Many children at this age are walking (or soon will be). There is a wide range of “normal”, however, and some children will not walk until between 15 and 18 months. (Encourage your child to walk by letting them move around without help and try not to pick them up too often!) At this age they will also be able to drink from a cup, point or gesture to things they are interested in, babble and say 1 or 2 words.     Mobility and increasing independence happen together around this age--this makes things challenging! Not only do they have to be watched more  carefully (they can get into more things than they could before!), but they will start wanting to have their own way more often. So, it is an important time to make sure they understand (or to teach them) that what they choose to do is okay or not okay. (If you laugh when they are hugging their favorite stuffed toy, but you also laugh when they throw food on the floor, they aren’t learning which is right and which is wrong.) Giving immediate, consistent feedback to a young one is the best way to let them know which actions are okay and which are not okay. Keep it simple--long lectures don’t work in this age group. Distracting them from unwanted behaviors by offering something else (another toy, a book, even a snuggle and a hug) can be very effective as well.     Remember, kids want your approval and your positive attention. So give kind words and smiles when they are behaving in a good way. When they are doing something you don’t like, turn away, ignore them or say something simple like “that’s not nice”.  And don’t wait until your child is misbehaving to give them feedback--being proactive (“catch them being good” or “time in”) is the best way for them to learn early what type of behavior will earn your positive attention and smiles. Our busy world and the constant presence of cell phones make it easy to ignore your child when they are behaving well--but that’s exactly the time you should be telling them that they are doing the right thing!    Try to avoid using the word “no” as much as you can. That’s an easy word for them to learn to say back to you, plus it only tells them what you do not want them to do-- it doesn’t really teach them a better option. Try saying “let’s not do this” followed by “let’s do this” more often; save “no” for situations when your baby might be getting in harm’s way. It will mean more if you don’t say it all the time.   DENTAL CARE:   A very important daily routine is teeth brushing.  Establish regular times each day for this task, ideally after breakfast and before bed. As soon as babies have teeth, you should be brushing them twice a day with a tiny smear (the size of a grain of rice!) of regular (fluoridated) toothpaste. (You don’t need to buy baby toothpaste.) Fluoride is very important for your child’s dental health. In addition to brushing with fluoridated toothpaste, your child should be drinking the tap (city) water (which is carefully monitored so it has the ideal amount of fluoride for dental health). If you have well water instead of a city water supply, however, you should have it tested for fluoride content to determine whether your child might need fluoride supplements.    It’s also important to get your child OFF the bottle as soon as possible. At this point, the bottle only causes problems, particularly tooth decay! If your child insists on a bottle, try putting only water in the bottle and everything else in a cup.     CAR SAFETY:   An approved car seat in the back seat of the car is required by [place] law. Your child is safest in a rear-facing car seat, so replace your infant car seat with a convertible car seat that can be kept rear facing until your child reaches the top height or weight limit allowed by the car seat’s .       ACCIDENT PREVENTION:  Setting up your home to be safe can also help shape your child’s behavior. When there are less things that are off limits to them, then you will be saying “no” and redirecting them less often.   1.  Falls: Try to remove furniture with sharp edges which can result in cuts and lacerations for toddling children (who fall frequently!).  Keep hernandez on stairs and window latches on upper-story windows.  2.  Burns: Begin to safely teach your child what “hot” and “cold” mean.  Have a family fire safety plan, including regular smoke detector (and carbon monoxide detector) battery checks, working fire extinguishers, and escape  routes.  3.  Poisoning: Keep all cleaning and chemical products safely stored out of sight and out of reach. Pay attention to what is under your bathroom sink as well as your kitchen sink. Keep purses (your own and ones belonging to visitors) out of reach of curious toddlers; they can quickly find medicines, cigarettes, and small objects to choke on!     For all medicines, including vitamins, keep the original safety caps that came with the bottle; do not transfer medicines to non-child-proofed or unlabeled containers. Be especially careful when around older people because they may keep their medicines out in the open or in non-childproofed containers.   Call the Poison Center at 1-950.462.8055 for any known or suspected poisoning (if you haven’t already, put this phone number in your phone for quick reference!).    LEAD EXPOSURE: If there is any lead in your home or yard, crawling and toddling children are at risk for lead poisoning because they are moving around on their own and touching so many things. Lead poisoning can have a harmful and irreversible effect on your child’s behavior, intelligence and learning potential, so it is very important to prevent and screen for lead exposure. If you live in [facility], your child should be screened for lead now (at age 12 months). If you do not live in [facility], talk to your doctor about lead screening if any of the following apply: (1) your child currently (or had previously) lives in or frequently visits a house or building built before 1950 (including , grandparent homes, etc); (2) your child currently (or had previously) lives in or frequently visits a house or building built before 1978 with recent or ongoing renovations; (3) your child has a brother, sister or playmate who has had lead poisoning; (4) your child is enrolled in Medicaid or WIC.  If you have questions about older neighborhoods in [place] that might have lead connecting (or  patient service) pipes, do an internet search for \"[place] lead awareness and drinking water safety\". From this web page, you can search for your address to find out if your home was built with lead service lines. There are also helpful hints regarding water safety on this site.         SLEEP: Establishing a consistent bedtime routine at this age can help develop good long term sleep habits.   • Create a nightly routine before bed starting with brushing teeth, spending quiet time with your child (read and sing to them), then ending with them soothing themselves to sleep in their crib. Be consistent with your routine!   • Avoid overly stimulating activities before bedtime, including active play, stimulating games or videos.   • A favorite toy and a nightlight are often helpful.   • Make sure your child does not nap too late in the afternoon (they need to be tired at bedtime!).  • Have a consistent “wake up” time as well; this further helps young children develop a regular sleep cycle.      SUN EXPOSURE: Continue to try to protect your baby from direct sun. Keep them in the shade as much as possible and use protective clothing (including hats and sunglasses) when you are out. Always use infant sunscreens with an SPF of 15 or higher to protect from sunburn and long term skin damage; apply it at least 20 to 30 minutes before going out in the sun.    SMOKING:  Continue to protect your child from cigarette smoke. Exposure to cigarette smoke will increase your baby’s risk of asthma, ear infections, and respiratory infections (pneumonia). If you smoke and are ready to consider quitting, talk to your doctor. Nicotine replacement products can be very helpful in breaking this tough addiction.       SHOES:  Children this age need shoes primarily to protect their feet when they start walking outside. When you buy shoes, choose ones that have a roomy fit and flat, flexible soles with nonskid bottoms. And don’t spend too much money--at  this age, they outgrow them quickly!      MEDICATION FOR FEVER OR PAIN:   Acetaminophen liquid (e.g., Tylenol or Tempra) may be given every four hours as needed for pain or fever.  Be sure to check which product CONCENTRATION you are using.    INFANT/CHILDREN’S Tylenol/Acetaminophen  (160 MG/5 mL)  Child’s Weight:            Dose:  12 - 17 pounds:           80 mg (2.5 mL  (1/2 Teaspoon))  18 - 23 pounds:           120 mg (3.75 mL (3/4 Teaspoon))      INFANT Ibuprofen (50 mg/1.25 ml) liquid (for example Advil or Motrin) may be given every 6 hours as needed for pain or fever.  Child’s Weight:            Dose:  12 - 17 pounds:           50 mg (1.25 mL)    18 - 23 pounds:           75 mg (1.875 mL)      CHILDREN'S Ibuprofen (100 mg/5 mL) liquid (for example Advil or Motrin) may be given every 6 hours as needed for pain or fever.  Child’s Weight:            Dose:  12 - 17 pounds:           50 mg (2.5 mL (1/2 Teaspoon))  18 - 23 pounds:           75 mg (3.75  mL (3/4 Teaspoon))  24 - 35 pounds           100 mg (5 mL (1 Teaspoon))    If Mariam is outside these weight ranges, call your pediatrician's office for advice.    Most Recent Immunizations   Administered Date(s) Administered   • DTaP/Hep B/IPV 03/09/2020   • Hep B, adolescent or pediatric 2019   • Hib (PRP-OMP) 01/16/2020   • Influenza, injectable, quadrivalent, preservative-free 04/13/2020   • Pneumococcal Conjugate 13 valent 03/09/2020   • Rotavirus - pentavalent 03/09/2020       If Mariam develops any of the following reactions within 72 hours after an immunization, notify your pediatrician by calling the pediatric phone nurse:  1. A temperature of 105 degrees or above  2. More than 3 hours of continuous crying  3. A shrill, high-pitched cry  4. A pale, limp spell  5. A seizure or fainting spell.  In this case, you should call 911 or go immediately to the emergency room.    NEXT VISIT:  15 MONTHS OF AGE    Thank you for entrusting your care to [facility].      Also, check out “Children’s Health” on the [facility] Blog for updates on timely topics regarding children’s health!

## 2021-08-11 NOTE — DISCHARGE NOTE ADULT - CARE PLAN
Principal Discharge DX:	Fall  Goal:	safety  Instructions for follow-up, activity and diet:	All images done while hospitalized were negative for acute injuries. Fall precautions. Promote safe environment. Participate in rehab programs.  Secondary Diagnosis:	Hypertension  Goal:	target bp 140/90  Instructions for follow-up, activity and diet:	Continue medications. Follow up with your PCP for further evaluation and management. Please call to make an appointment.  Secondary Diagnosis:	Dementia  Secondary Diagnosis:	UTI (urinary tract infection)  Instructions for follow-up, activity and diet:	You received 3 days of IV antibiotics. You never had any discomfort when urinating, however you had blood in your urine. Seek  medical attention of bleeding persists and or your start having discomfort; ie burining. Recommended to follow up with Urology as outpatient. Please call to make an appointment. Additional Area 1 Location: lip flip

## 2021-08-17 NOTE — CONSULT NOTE ADULT - PROBLEM SELECTOR RECOMMENDATION 2
Pt called to get refill on her Lisinopril 10 mg 1 tab daily  Would like it to be called into [facility] in [facility] 
pt s/p RT  in march  deemed not a candidate for further dmt  asked if pt was offered hospice- son was unaware

## 2022-05-07 NOTE — ED PROVIDER NOTE - PMH
CALLED [facility] FOR PT PRESENTATION.  SAID THEY WILL HAVE THE ADMITTING 
DOCTOR TO CALL BACK. AWAITING CALL BACK. Alzheimers disease    Atrial fibrillation    Hypertension Alzheimers disease    Atrial fibrillation    Endometrial cancer    Hypertension

## 2022-05-12 NOTE — ED PROVIDER NOTE - PROGRESS NOTE DETAILS
no
Rob SALGADO, PGY-4: given pt's overall very poor prognosis, discussed goals of care with pt's daughter Nasra Rivera. The family have not considered goals of care before, and while Narsa understands pt's poor prognosis, she wants everything done (including blood transfusion) until she has a chance to discuss goals of care with other family members.

## 2023-10-06 NOTE — ED ADULT NURSE NOTE - FALL HARM RISK TYPE OF ASSESSMENT
Daily Assessment Benzoyl Peroxide Pregnancy And Lactation Text: This medication is Pregnancy Category C. It is unknown if benzoyl peroxide is excreted in breast milk.

## 2024-08-21 NOTE — ED PROVIDER NOTE - PRINCIPAL DIAGNOSIS
Patient was here today for a nuclear treadmill ordered by Dr. Bales.  Pt is tough stick, myself and the nurses were unable to successfully insert an IV.  I explained to the pt that she can have the test done at [facility] with the vein finder.  Pt was in agreement with this plan.  I gave her the number for [facility] and she will call to schedule the test there.    Fall
